# Patient Record
Sex: FEMALE | Race: WHITE | NOT HISPANIC OR LATINO | Employment: FULL TIME | URBAN - METROPOLITAN AREA
[De-identification: names, ages, dates, MRNs, and addresses within clinical notes are randomized per-mention and may not be internally consistent; named-entity substitution may affect disease eponyms.]

---

## 2017-02-17 ENCOUNTER — OFFICE VISIT - HEALTHEAST (OUTPATIENT)
Dept: INTERNAL MEDICINE | Facility: CLINIC | Age: 37
End: 2017-02-17

## 2017-02-17 DIAGNOSIS — H66.90 AOM (ACUTE OTITIS MEDIA): ICD-10-CM

## 2017-02-17 DIAGNOSIS — J06.9 ACUTE URI: ICD-10-CM

## 2017-02-17 ASSESSMENT — MIFFLIN-ST. JEOR: SCORE: 1557.41

## 2017-06-05 ENCOUNTER — OFFICE VISIT - HEALTHEAST (OUTPATIENT)
Dept: PODIATRY | Facility: CLINIC | Age: 37
End: 2017-06-05

## 2017-06-05 DIAGNOSIS — L60.2 ONYCHAUXIS: ICD-10-CM

## 2017-06-05 DIAGNOSIS — B35.1 NAIL FUNGUS: ICD-10-CM

## 2018-03-02 ENCOUNTER — AMBULATORY - HEALTHEAST (OUTPATIENT)
Dept: FAMILY MEDICINE | Facility: CLINIC | Age: 38
End: 2018-03-02

## 2018-03-02 DIAGNOSIS — Z86.39 HISTORY OF VITAMIN D DEFICIENCY: ICD-10-CM

## 2018-03-02 DIAGNOSIS — I10 HTN (HYPERTENSION): ICD-10-CM

## 2018-03-02 DIAGNOSIS — Z00.00 BLOOD TESTS FOR ROUTINE GENERAL PHYSICAL EXAMINATION: ICD-10-CM

## 2018-03-07 ENCOUNTER — AMBULATORY - HEALTHEAST (OUTPATIENT)
Dept: LAB | Facility: CLINIC | Age: 38
End: 2018-03-07

## 2018-03-07 DIAGNOSIS — Z86.39 HISTORY OF VITAMIN D DEFICIENCY: ICD-10-CM

## 2018-03-07 DIAGNOSIS — I10 HTN (HYPERTENSION): ICD-10-CM

## 2018-03-07 DIAGNOSIS — Z00.00 BLOOD TESTS FOR ROUTINE GENERAL PHYSICAL EXAMINATION: ICD-10-CM

## 2018-03-07 LAB
ANION GAP SERPL CALCULATED.3IONS-SCNC: 12 MMOL/L (ref 5–18)
BUN SERPL-MCNC: 17 MG/DL (ref 8–22)
CALCIUM SERPL-MCNC: 9.9 MG/DL (ref 8.5–10.5)
CHLORIDE BLD-SCNC: 102 MMOL/L (ref 98–107)
CHOLEST SERPL-MCNC: 146 MG/DL
CO2 SERPL-SCNC: 26 MMOL/L (ref 22–31)
CREAT SERPL-MCNC: 0.75 MG/DL (ref 0.6–1.1)
ERYTHROCYTE [DISTWIDTH] IN BLOOD BY AUTOMATED COUNT: 12.5 % (ref 11–14.5)
FASTING STATUS PATIENT QL REPORTED: YES
GFR SERPL CREATININE-BSD FRML MDRD: >60 ML/MIN/1.73M2
GLUCOSE BLD-MCNC: 88 MG/DL (ref 70–125)
HCT VFR BLD AUTO: 46.1 % (ref 35–47)
HDLC SERPL-MCNC: 42 MG/DL
HGB BLD-MCNC: 15.5 G/DL (ref 12–16)
LDLC SERPL CALC-MCNC: 75 MG/DL
MCH RBC QN AUTO: 30.9 PG (ref 27–34)
MCHC RBC AUTO-ENTMCNC: 33.7 G/DL (ref 32–36)
MCV RBC AUTO: 92 FL (ref 80–100)
PLATELET # BLD AUTO: 257 THOU/UL (ref 140–440)
PMV BLD AUTO: 7.7 FL (ref 7–10)
POTASSIUM BLD-SCNC: 4.3 MMOL/L (ref 3.5–5)
RBC # BLD AUTO: 5.03 MILL/UL (ref 3.8–5.4)
SODIUM SERPL-SCNC: 140 MMOL/L (ref 136–145)
TRIGL SERPL-MCNC: 147 MG/DL
WBC: 5.7 THOU/UL (ref 4–11)

## 2018-03-08 ENCOUNTER — COMMUNICATION - HEALTHEAST (OUTPATIENT)
Dept: FAMILY MEDICINE | Facility: CLINIC | Age: 38
End: 2018-03-08

## 2018-03-08 LAB — 25(OH)D3 SERPL-MCNC: 17.9 NG/ML (ref 30–80)

## 2018-03-21 ENCOUNTER — OFFICE VISIT - HEALTHEAST (OUTPATIENT)
Dept: FAMILY MEDICINE | Facility: CLINIC | Age: 38
End: 2018-03-21

## 2018-03-21 DIAGNOSIS — E72.10 DISORDER OF SULFUR-BEARING AMINO ACID METABOLISM (H): ICD-10-CM

## 2018-03-21 DIAGNOSIS — I10 ESSENTIAL HYPERTENSION: ICD-10-CM

## 2018-03-21 DIAGNOSIS — Z00.00 ROUTINE GENERAL MEDICAL EXAMINATION AT A HEALTH CARE FACILITY: ICD-10-CM

## 2018-03-21 DIAGNOSIS — E55.9 VITAMIN D DEFICIENCY: ICD-10-CM

## 2018-03-21 DIAGNOSIS — Z30.432 ENCOUNTER FOR IUD REMOVAL: ICD-10-CM

## 2018-03-21 ASSESSMENT — MIFFLIN-ST. JEOR: SCORE: 1631.72

## 2018-03-29 ENCOUNTER — COMMUNICATION - HEALTHEAST (OUTPATIENT)
Dept: FAMILY MEDICINE | Facility: CLINIC | Age: 38
End: 2018-03-29

## 2018-09-25 ENCOUNTER — OFFICE VISIT - HEALTHEAST (OUTPATIENT)
Dept: FAMILY MEDICINE | Facility: CLINIC | Age: 38
End: 2018-09-25

## 2018-09-25 DIAGNOSIS — J02.0 ACUTE STREPTOCOCCAL PHARYNGITIS: ICD-10-CM

## 2018-09-25 DIAGNOSIS — J02.9 SORE THROAT: ICD-10-CM

## 2018-09-25 DIAGNOSIS — I10 ESSENTIAL HYPERTENSION: ICD-10-CM

## 2018-09-25 LAB — DEPRECATED S PYO AG THROAT QL EIA: NORMAL

## 2018-09-25 ASSESSMENT — MIFFLIN-ST. JEOR: SCORE: 1665.18

## 2018-09-26 LAB — GROUP A STREP BY PCR: NORMAL

## 2018-10-31 ENCOUNTER — OFFICE VISIT - HEALTHEAST (OUTPATIENT)
Dept: FAMILY MEDICINE | Facility: CLINIC | Age: 38
End: 2018-10-31

## 2018-10-31 DIAGNOSIS — R03.0 ELEVATED BLOOD PRESSURE READING: ICD-10-CM

## 2018-10-31 DIAGNOSIS — H92.02 LEFT EAR PAIN: ICD-10-CM

## 2018-10-31 DIAGNOSIS — M26.622 ARTHRALGIA OF LEFT TEMPOROMANDIBULAR JOINT: ICD-10-CM

## 2018-11-27 ENCOUNTER — COMMUNICATION - HEALTHEAST (OUTPATIENT)
Dept: FAMILY MEDICINE | Facility: CLINIC | Age: 38
End: 2018-11-27

## 2018-12-11 ENCOUNTER — COMMUNICATION - HEALTHEAST (OUTPATIENT)
Dept: FAMILY MEDICINE | Facility: CLINIC | Age: 38
End: 2018-12-11

## 2019-01-18 ENCOUNTER — COMMUNICATION - HEALTHEAST (OUTPATIENT)
Dept: FAMILY MEDICINE | Facility: CLINIC | Age: 39
End: 2019-01-18

## 2019-02-19 ENCOUNTER — COMMUNICATION - HEALTHEAST (OUTPATIENT)
Dept: FAMILY MEDICINE | Facility: CLINIC | Age: 39
End: 2019-02-19

## 2019-03-13 ENCOUNTER — OFFICE VISIT - HEALTHEAST (OUTPATIENT)
Dept: FAMILY MEDICINE | Facility: CLINIC | Age: 39
End: 2019-03-13

## 2019-03-13 DIAGNOSIS — I10 ESSENTIAL HYPERTENSION: ICD-10-CM

## 2019-03-13 DIAGNOSIS — J40 BRONCHITIS: ICD-10-CM

## 2019-03-13 LAB
ANION GAP SERPL CALCULATED.3IONS-SCNC: 11 MMOL/L (ref 5–18)
BUN SERPL-MCNC: 14 MG/DL (ref 8–22)
CALCIUM SERPL-MCNC: 10 MG/DL (ref 8.5–10.5)
CHLORIDE BLD-SCNC: 106 MMOL/L (ref 98–107)
CO2 SERPL-SCNC: 24 MMOL/L (ref 22–31)
CREAT SERPL-MCNC: 0.81 MG/DL (ref 0.6–1.1)
GFR SERPL CREATININE-BSD FRML MDRD: >60 ML/MIN/1.73M2
GLUCOSE BLD-MCNC: 114 MG/DL (ref 70–125)
POTASSIUM BLD-SCNC: 4.1 MMOL/L (ref 3.5–5)
SODIUM SERPL-SCNC: 141 MMOL/L (ref 136–145)

## 2019-03-13 NOTE — ASSESSMENT & PLAN NOTE
Patient is in clinic for acute care.  Her blood pressure is well controlled.  She said no side effects from the medications.  She is 38 years old and on an ACE inhibitor.  Her  is had a vasectomy and she is aware that this is a pregnancy category X medication.  -Continue current medication without change.  -Check basic metabolic panel today.  -Recheck blood pressure in 6 months.

## 2019-03-14 LAB
25(OH)D3 SERPL-MCNC: 16.8 NG/ML (ref 30–80)
25(OH)D3 SERPL-MCNC: 16.8 NG/ML (ref 30–80)

## 2019-10-31 ENCOUNTER — OFFICE VISIT - HEALTHEAST (OUTPATIENT)
Dept: FAMILY MEDICINE | Facility: CLINIC | Age: 39
End: 2019-10-31

## 2019-10-31 ENCOUNTER — HOSPITAL ENCOUNTER (OUTPATIENT)
Dept: ULTRASOUND IMAGING | Facility: CLINIC | Age: 39
Discharge: HOME | End: 2019-10-31
Attending: FAMILY MEDICINE

## 2019-10-31 ENCOUNTER — COMMUNICATION - HEALTHEAST (OUTPATIENT)
Dept: FAMILY MEDICINE | Facility: CLINIC | Age: 39
End: 2019-10-31

## 2019-10-31 ENCOUNTER — AMBULATORY - HEALTHEAST (OUTPATIENT)
Dept: FAMILY MEDICINE | Facility: CLINIC | Age: 39
End: 2019-10-31

## 2019-10-31 DIAGNOSIS — I10 ESSENTIAL HYPERTENSION: ICD-10-CM

## 2019-10-31 DIAGNOSIS — N92.0 MENORRHAGIA WITH REGULAR CYCLE: ICD-10-CM

## 2019-10-31 DIAGNOSIS — N63.10 BREAST MASS, RIGHT: ICD-10-CM

## 2019-10-31 DIAGNOSIS — E72.10 DISORDER OF SULFUR-BEARING AMINO ACID METABOLISM (H): ICD-10-CM

## 2019-10-31 ASSESSMENT — MIFFLIN-ST. JEOR: SCORE: 1572.76

## 2019-11-01 ENCOUNTER — HOSPITAL ENCOUNTER (OUTPATIENT)
Dept: MAMMOGRAPHY | Facility: CLINIC | Age: 39
Discharge: HOME | End: 2019-11-01
Attending: FAMILY MEDICINE

## 2019-11-01 ENCOUNTER — HOSPITAL ENCOUNTER (OUTPATIENT)
Dept: ULTRASOUND IMAGING | Facility: CLINIC | Age: 39
Discharge: HOME | End: 2019-11-01
Attending: FAMILY MEDICINE

## 2019-11-01 DIAGNOSIS — N63.10 BREAST MASS, RIGHT: ICD-10-CM

## 2020-03-01 ENCOUNTER — COMMUNICATION - HEALTHEAST (OUTPATIENT)
Dept: FAMILY MEDICINE | Facility: CLINIC | Age: 40
End: 2020-03-01

## 2020-03-01 DIAGNOSIS — I10 ESSENTIAL HYPERTENSION: ICD-10-CM

## 2020-07-20 ENCOUNTER — COMMUNICATION - HEALTHEAST (OUTPATIENT)
Dept: SCHEDULING | Facility: CLINIC | Age: 40
End: 2020-07-20

## 2020-07-20 DIAGNOSIS — Z11.59 SCREENING FOR VIRAL DISEASE: ICD-10-CM

## 2020-07-21 ENCOUNTER — AMBULATORY - HEALTHEAST (OUTPATIENT)
Dept: LAB | Facility: CLINIC | Age: 40
End: 2020-07-21

## 2020-07-21 DIAGNOSIS — Z11.59 SCREENING FOR VIRAL DISEASE: ICD-10-CM

## 2020-07-24 ENCOUNTER — COMMUNICATION - HEALTHEAST (OUTPATIENT)
Dept: LAB | Facility: CLINIC | Age: 40
End: 2020-07-24

## 2020-07-24 ENCOUNTER — OFFICE VISIT - HEALTHEAST (OUTPATIENT)
Dept: FAMILY MEDICINE | Facility: CLINIC | Age: 40
End: 2020-07-24

## 2020-07-24 DIAGNOSIS — R10.13 EPIGASTRIC PAIN: ICD-10-CM

## 2020-07-24 ASSESSMENT — MIFFLIN-ST. JEOR: SCORE: 1653.39

## 2020-11-23 ENCOUNTER — COMMUNICATION - HEALTHEAST (OUTPATIENT)
Dept: FAMILY MEDICINE | Facility: CLINIC | Age: 40
End: 2020-11-23

## 2020-11-23 DIAGNOSIS — I10 ESSENTIAL HYPERTENSION: ICD-10-CM

## 2020-11-23 RX ORDER — LISINOPRIL AND HYDROCHLOROTHIAZIDE 20; 25 MG/1; MG/1
TABLET ORAL
Qty: 45 TABLET | Refills: 3 | Status: SHIPPED | OUTPATIENT
Start: 2020-11-23 | End: 2021-10-25

## 2021-05-27 ENCOUNTER — RECORDS - HEALTHEAST (OUTPATIENT)
Dept: ADMINISTRATIVE | Facility: CLINIC | Age: 41
End: 2021-05-27

## 2021-05-30 VITALS — BODY MASS INDEX: 27.89 KG/M2 | WEIGHT: 184 LBS | HEIGHT: 68 IN

## 2021-05-31 ENCOUNTER — RECORDS - HEALTHEAST (OUTPATIENT)
Dept: ADMINISTRATIVE | Facility: CLINIC | Age: 41
End: 2021-05-31

## 2021-06-01 ENCOUNTER — RECORDS - HEALTHEAST (OUTPATIENT)
Dept: ADMINISTRATIVE | Facility: CLINIC | Age: 41
End: 2021-06-01

## 2021-06-01 VITALS — BODY MASS INDEX: 30.31 KG/M2 | WEIGHT: 200 LBS | HEIGHT: 68 IN

## 2021-06-02 ENCOUNTER — RECORDS - HEALTHEAST (OUTPATIENT)
Dept: ADMINISTRATIVE | Facility: CLINIC | Age: 41
End: 2021-06-02

## 2021-06-02 VITALS — BODY MASS INDEX: 32.01 KG/M2 | WEIGHT: 209 LBS

## 2021-06-02 VITALS — WEIGHT: 202 LBS | BODY MASS INDEX: 30.94 KG/M2

## 2021-06-02 VITALS — BODY MASS INDEX: 31.43 KG/M2 | WEIGHT: 207.38 LBS | HEIGHT: 68 IN

## 2021-06-02 NOTE — PATIENT INSTRUCTIONS - HE
To schedule your pelvic ultrasound at [facility] or [facility]:  Call [phone]

## 2021-06-02 NOTE — PROGRESS NOTES
"SUBJECTIVE  Claire Willoughby is a 38 y.o. female here for:    Chief Complaint   Patient presents with     Consult     Ablation/Hysterectomy     Breast Mass     R breast     HTN: on HCTZ-lisinopril. She reports good compliance. Checks at work 120's/78-80's. Denies chest pain, shortness of breath.      R breast lump- noticed about 2 days ago. 3 finger widths wide. Never noticed this before. Mobile. Nonpainful at first but now that she has been touching it for the past few days, it is tender. Feels \"hot\". No drainage or erythema. No nipple changes or drainage.     Menorrhagia: regular periods. - has vasectomy. She previously had copper IUD but it was removed. She continues to have heavy periods. Has had heavier periods her entire life. Worse since copper IUD removed. Hx of MTHF reductase deficiency and she has contraindication to using hormonal contraception. She feels that she is anemic due to heavy menses. Regular, every 30 days. Periods last 7-10 days- first 4 days are heavy. She uses menstrual cup and it fills every 2 hours. She has clots- half dollar sized. Fills pad in about 2 hours. Had low back pain during her period the last month. Mom- history of hysterectomy in her 40's. She does not desire future fertility-  has vasectomy and she is wondering about ablation vs hysterectomy. No family history of uterine or ovarian or cervical cancer. She is up to date on pap smear. Hx of LEEP 2009? But subsequent normal paps since that time. Denies vaginal discharge, odor.     ROS  Complete 10 point review of systems negative except as noted above in HPI    Reviewed Past Medical History, Medications, Family History and Social History in Epic and up to date with no new changes.    OBJECTIVE  /88   Pulse 72   Temp 97.8  F (36.6  C) (Oral)   Resp 16   Ht 5' 7.75\" (1.721 m)   Wt 187 lb (84.8 kg)   LMP 10/21/2019 (Exact Date)   BMI 28.64 kg/m       General: Cooperative, pleasant, in no acute " distress  Breast: R breast with mobile, circular 3x3 cm subcutaneous mass at approximately 10 o clock. Normal nipple without inversion or discharge. Normal left breast.  CV: RRR, normal S1/S2, no murmur, rubs, gallops  Resp: No respiratory distress. Clear to auscultation bilaterally. No wheezes, rales, rhonchi  : Deferred per patient request- pelvic exam normal 3/2018.      ASSESSMENT/PLAN:   Claire was seen today for consult and breast mass.    Diagnoses and all orders for this visit:    Menorrhagia with regular cycle: Longstanding heavy periods, regular. She had copper IUD that was removed March 2018 and she continues to have menorrhagia with clots. Will obtain baseline hemoglobin. - history of vasectomy and she does not desire future fertility. Up to date on pap smear. Normal pelvic exam 3/2018. Deferred repeat exam today. Referred for pelvic ultrasound and then would consider referral to OB/GYN for discussion of treatment for menorrhagia. She is not candidate for hormonal methods/mirena IUD due to history of MTHF reductase deficiency.   -     HM2(CBC w/o Differential)  -     US Pelvis With Transvaginal Non OB; Future    Breast mass, right: Noticed for last few days, mobile, slightly tender circular 3x3 cm mass at 10 o clock in R breast. No overlying erythema or nipple changes/drainage. Referred for imaging and further diagnostic evaluation.  -     US Breast Right Limited 1-3 Quadrants; Future  -     Mammo Diagnostic Bilateral; Future    Essential hypertension: BP elevated initially but recheck was normal. Continue lisinopril-HCTZ- could increase dose in future if necessary. Non-smoker. Asymptomatic. She had normal BMP 3/2019.     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  has vasectomy- she is monogamous with . Copper IUD was removed 2018.      RTC in March 2020 for annual physical      Options for treatment  and follow-up care were reviewed with the patient. Claire KIERRA Willuoghby and/or guardian was engaged and actively involved in the decision making process. Claire Willoughby and/or guardian verbalized understanding of the options discussed and was satisfied with the final plan.      Sharifa Naylor MD

## 2021-06-03 VITALS
TEMPERATURE: 97.8 F | SYSTOLIC BLOOD PRESSURE: 128 MMHG | DIASTOLIC BLOOD PRESSURE: 88 MMHG | RESPIRATION RATE: 16 BRPM | BODY MASS INDEX: 28.34 KG/M2 | WEIGHT: 187 LBS | HEART RATE: 72 BPM | HEIGHT: 68 IN

## 2021-06-04 VITALS
RESPIRATION RATE: 18 BRPM | OXYGEN SATURATION: 97 % | HEART RATE: 85 BPM | BODY MASS INDEX: 32.55 KG/M2 | WEIGHT: 207.4 LBS | HEIGHT: 67 IN | SYSTOLIC BLOOD PRESSURE: 124 MMHG | DIASTOLIC BLOOD PRESSURE: 86 MMHG | TEMPERATURE: 97.9 F

## 2021-06-09 NOTE — PROGRESS NOTES
"  Chief Complaint   Patient presents with     Abdominal Pain     started on Monday, feels discomfort with every food she eats, feels as if her stomach is full uncomfortably. Much better when stading or laying down. Has had diarrhea, and has diarrhea during menstruation.          HPI:   Claire De La Cruz is a 39 y.o. female c/o epigastric pain for the last four days after eating.  Improves after about two hours.  Feels worse with sitting.  No fever.  No nausea. No vomiting.  Has had some persistent loose stools over the last 6 months.  No blood in stools.  Thinks she eats dairy with most meals.  Seems to be worse after evening meal.  Doesn't keep her from sleeping.  Better in the morning.  No weight loss.  No one else at home is sick.    No mucous in stools.    Periods have changed a little in the last 6 months- a little heavier, a little longer bleeding.  No cramping.   has had a vasectomy.    No contributory family history.    ROS:  A 10 point comprehensive review of systems was negative except as noted.     Medications:  Current Outpatient Medications on File Prior to Visit   Medication Sig Dispense Refill     lisinopril-hydrochlorothiazide (PRINZIDE,ZESTORETIC) 10-12.5 mg per tablet Take 1 tablet by mouth daily. 90 tablet 3     No current facility-administered medications on file prior to visit.          Social History:  Social History     Tobacco Use     Smoking status: Never Smoker     Smokeless tobacco: Never Used   Substance Use Topics     Alcohol use: Yes     Comment: Seldom         Physical Exam:   Vitals:    07/24/20 1319   BP: 124/86   Pulse: 85   Resp: 18   Temp: 97.9  F (36.6  C)   TempSrc: Oral   SpO2: 97%   Weight: 207 lb 6.4 oz (94.1 kg)   Height: 5' 7\" (1.702 m)       GENERAL:   Alert. Oriented.  EYES: Clear  HENT:  Ears: R TM pearly gray. Normal landmarks. L TM pearly gray.  Normal landmarks  Nose: Clear.  Sinuses: Nontender.  Oropharynx:  No erythema. No exudate.  NECK: Supple. No " adenopathy.  LUNGS: Clear to ascultation.  No crackles.  No wheezing  HEART: RRR  SKIN:  No rash.   ABDOMEN:  +BS. No tenderness. Soft, no guarding, rebound, rigidity,mass, or organomegaly. No CVA tenderness          Assessment/Plan:    1. Epigastric pain        She has omeprazole at home and will start that daily.  May also try an antacid.  Discussed warning signs--if occur needs prompt evaluation.    If no improvement in 3-4 weeks, recheck.          Yen De La Cruz MD      7/24/2020    The following portions of the patient's history were reviewed and updated as appropriate: allergies, current medications, past family history, past medical history, past social history, past surgical history and problem list.

## 2021-06-09 NOTE — TELEPHONE ENCOUNTER
"Patient is calling requesting COVID serologic antibody testing.  NOTE: Serologic testing is a blood test for 'antibodies' which are made at 10-14 days after you have had symptoms of COVID or were exposed and had an asymptomatic infection.  This does NOT test you for 'active' infection or tell you if you are contagious.    Are you a healthcare worker?  Yes  Do you currently have a cough, fever, body aches, shortness of breath, or difficulty breathing?  No  Did you previously have cough, fever, body aches, shortness of breath, or difficulty breathing that have now resolved? Has had previous covid symptoms.     Symptoms started > 14 days ago. Lab order placed per SARS-CoV-2 Serology test Standing Order using indication \"Previously symptomatic >14d since onset, currently asymptomatic\" and diagnosis code \"Screening for viral disease\" (Z11.59)      The patient was informed: \"Testing is limited each day and it may take time for testing to be available to everyone who has called. You will receive a call within 48-72 hours to schedule the serology testing. Please confirm the best number to reach you is [phone]. If you have any questions about scheduling, call [phone].\"     "

## 2021-06-11 NOTE — PROGRESS NOTES
Admission History & Physical  Claire Willoughby, 1980, [id number]    [facility]  No Primary Care Provider, None    Extended Emergency Contact Information  Primary Emergency Contact: Royal Rajan   [facility]  Home Phone: [phone]  Relation: Spouse     Assessment and Plan:   Assessment: Onychomycosis, onychauxis second toe right foot  Plan: Debrided the second toenail right foot  Active Problems:    * No active hospital problems. *      Chief Complaint:  thick discolored toenails second toe right foot      HPI:    Claire Willoughby is a 36 y.o. old female who presented to the clinic today complaining of a very thick painful discolored second toenail right foot.  The patient indicated she has had this problem toenail for several months.  She stated that she recalls injuring the toenail several years ago.  The nail is aggravated by shoe gear.  She has not had any redness, swelling, drainage or bleeding.  She denies any other previous treatment.  History is provided by patient    Medical History  Active Ambulatory (Non-Hospital) Problems    Diagnosis     Methylenetetrahydrofolate Reductase Deficiency     Hyperlipidemia     Vitamin D Deficiency     Proteinuria     Hypertension     Past Medical History:   Diagnosis Date     Abnormal Pap smear of cervix      Hypertension      Postpartum hemorrhage 2010     Patient Active Problem List    Diagnosis Date Noted     Methylenetetrahydrofolate Reductase Deficiency      Hyperlipidemia      Vitamin D Deficiency      Proteinuria      Hypertension      Surgical History  She  has a past surgical history that includes Cholecystectomy and Cervical biopsy w/ loop electrode excision (2009).   Past Surgical History:   Procedure Laterality Date     CERVICAL BIOPSY  W/ LOOP ELECTRODE EXCISION  2009    unknown dx.  normal paps since.     CHOLECYSTECTOMY      Social History  Reviewed, and she  reports that she has never smoked. She does not have any smokeless  tobacco history on file.  Social History   Substance Use Topics     Smoking status: Never Smoker     Smokeless tobacco: Not on file     Alcohol use Not on file      Allergies  Allergies   Allergen Reactions     Benzonatate Nausea And Vomiting     No Known Drug Allergies     Family History  Reviewed, and family history includes Hypertension in her father; No Medical Problems in her mother.   Psychosocial Needs  Social History     Social History Narrative     Additional psychosocial needs reviewed per nursing assessment.       Prior to Admission Medications     (Not in a hospital admission)        Review of Systems - Negative     BP (!) 142/112  Pulse 78  SpO2 99%    Objective findings: General: The patient is alert and in no acute distress      Integument: Nails bilateral feet are  normal length and color with the exception of the second toenail right foot.  This particular nail is 3-4 times normal thickness with subungual debris present. Skin bilateral feet warm and intact.      Vascular: DP pulses are +2/4 bilateral feet.   Capillary refill less than 2 seconds bilateral feet.      Neurologic: Negative clonus, negative Babinski bilateral feet.       Musculoskeletal: Range of motion within normal limits bilaterally.  Muscle power +5/5 bilaterally in all compartments.      Assessment: Onychomycosis, onychauxis second toe right foot, diabetes mellitus      Plan: Debrided the second toenail right foot.  I recommended the patient return to the clinic as needed for continued foot care.

## 2021-06-13 NOTE — TELEPHONE ENCOUNTER
RN cannot approve Refill Request    RN can NOT refill this medication Protocol failed and NO refill given. Last office visit: 10/31/2019 Sharifa Naylor MD Last Physical: 3/21/2018 Last MTM visit: Visit date not found Last visit same specialty: 7/24/2020 Yen De La Cruz MD.  Next visit within 3 mo: Visit date not found  Next physical within 3 mo: Visit date not found      Carlie Valencia, [facility] Triage/Med Refill 11/23/2020    Requested Prescriptions   Pending Prescriptions Disp Refills     lisinopriL-hydrochlorothiazide (PRINZIDE,ZESTORETIC) 20-25 mg per tablet [Pharmacy Med Name: LISINOPRIL-HCTZ 20/25MG TABLETS] 45 tablet 0     Sig: TAKE 1/2 TABLET BY MOUTH DAILY       Diuretics/Combination Diuretics Refill Protocol  Failed - 11/23/2020  3:56 AM        Failed - Serum Potassium in past 12 months      No results found for: LN-POTASSIUM          Failed - Serum Sodium in past 12 months      No results found for: LN-SODIUM          Failed - Serum Creatinine in past 12 months      Creatinine   Date Value Ref Range Status   03/13/2019 0.81 0.60 - 1.10 mg/dL Final             Passed - Visit with PCP or prescribing provider visit in past 12 months     Last office visit with prescriber/PCP: 10/31/2019 Sharifa Naylor MD OR same dept: 7/24/2020 Yen De La Cruz MD OR same specialty: 7/24/2020 Yen De La Cruz MD  Last physical: 3/21/2018 Last MTM visit: Visit date not found   Next visit within 3 mo: Visit date not found  Next physical within 3 mo: Visit date not found  Prescriber OR PCP: Sharifa Naylor MD  Last diagnosis associated with med order: 1. Essential hypertension  - lisinopriL-hydrochlorothiazide (PRINZIDE,ZESTORETIC) 20-25 mg per tablet [Pharmacy Med Name: LISINOPRIL-HCTZ 20/25MG TABLETS]; TAKE 1/2 TABLET BY MOUTH DAILY  Dispense: 45 tablet; Refill: 0    If protocol passes may refill for 12 months if within 3 months of last provider visit (or a total of 15 months).             Passed - Blood pressure  on file in past 12 months     BP Readings from Last 1 Encounters:   07/24/20 124/86

## 2021-06-16 PROBLEM — N92.0 MENORRHAGIA WITH REGULAR CYCLE: Status: ACTIVE | Noted: 2019-11-02

## 2021-06-16 NOTE — PROGRESS NOTES
Assessment:     Claire was seen today for establish care and annual exam.    Diagnoses and all orders for this visit:    Routine general medical examination at a health care facility    Hypertension: Initially elevated BP but normotensive on repeat. Encouraged to continue checking at-home and call if SBP >140 or DBP >90 consistently and would increase dose of her antihypertensive. Had recent BMP that was normal. Asymptomatic, nonsmoker.  -     Refilled lisinopril-hydrochlorothiazide (PRINZIDE,ZESTORETIC) 10-12.5 mg per tablet; Take 1 tablet by mouth daily.    Vitamin D deficiency: Recently checked, 17.9. Continue daily supplement for maintenance therapy.    Methylenetetrahydrofolate Reductase Deficiency: Reportedly was on anticoagulation during pregnancy and told she should never have hormonal birth control. Will obtain CABRERA today for her OB/GYN clinic.     Encounter for IUD removal: Paraguard placed 2010, her fiance had vasectomy. She denies wanting other children- has one child and he has two other children. See procedure note below- tolerated procedure well without complications. If heavy periods continue despite removal of copper IUD, then could consider vaginal ultrasound for evaluation. She had normal appearing cervix on exam today.       Subjective:      Claire Willoughby is a 37 y.o. female who presents for an annual exam. The patient is sexually active. The patient participates in regular exercise: no. The patient reports that there is not domestic violence in her life.     Heavy periods 7-8 days. 5-6 pads per day. Had heavy periods before paraguard  Paraguard- placed 8 years ago. Desires removal today  Fiance- vasectomy  Does not desire more children  Still having some intermitent spotting- does not require pain  Occasionally a sharp pain in RLQ    HTN: checks at home occasionally and always normal. SBP around 110-120 usually. Denies severe headaches, SOB, chest pain. Did not take dose of her medication  yesterday. She reports good compliance otherwise. Recent BMP was normal.     Healthy Habits:   Regular Exercise: No  Sunscreen Use: Yes  Healthy Diet: Yes  Dental Visits Regularly: Yes  Seat Belt: Yes  Sexually active: Yes  Self Breast Exam Monthly:Yes  Hemoccults: N/A  Flex Sig: N/A  Colonoscopy: N/A  Lipid Profile: Yes  Glucose Screen: Yes  Prevention of Osteoporosis: No  Last Dexa: N/A  Guns at Home:  No      Immunization History   Administered Date(s) Administered     DT (pediatric) 1998, 2005     HPV Quadrivalent 2008, 2008, 2009     Hep B, historic 1998, 1998, 2005     Influenza [id number], 2009     Influenza, inj, historic,unspecified 10/30/2008, 10/21/2009, 2010     Influenza, seasonal,quad inj 6-35 mos 10/04/2012     MMR 1995     Td,adult,historic,unspecified 2005     Tdap 2015     Immunization status: up to date and documented.      Gynecologic History  Patient's last menstrual period was 2018 (approximate).  Contraception: copper IUD, placed   Last Pap: 7/24/15, normal with cotesting. Not due for another 5 years  Last mammogram: N/A      OB History    Para Term  AB Living   1 1    1   SAB TAB Ectopic Multiple Live Births             # Outcome Date GA Lbr Ashish/2nd Weight Sex Delivery Anes PTL Lv   1 Para                   Current Outpatient Prescriptions   Medication Sig Dispense Refill     copper (PARAGARD T 380A) 380 square mm IUD by Intrauterine route. Inserted        lisinopril-hydrochlorothiazide (PRINZIDE,ZESTORETIC) 10-12.5 mg per tablet Take 1 tablet by mouth daily. 90 tablet 3     albuterol (PROVENTIL HFA;VENTOLIN HFA) 90 mcg/actuation inhaler Inhale 2 puffs every 4 (four) hours as needed for wheezing. 8.5 g 2     No current facility-administered medications for this visit.      Past Medical History:   Diagnosis Date     Abnormal Pap smear of cervix      Hypertension      Postpartum hemorrhage  "2010     Past Surgical History:   Procedure Laterality Date     CERVICAL BIOPSY  W/ LOOP ELECTRODE EXCISION  2009    unknown dx.  normal paps since.     CHOLECYSTECTOMY       Benzonatate and No known drug allergies  Family History   Problem Relation Age of Onset     No Medical Problems Mother      Hypertension Father      Social History     Social History     Marital status: Single     Spouse name: N/A     Number of children: N/A     Years of education: N/A     Occupational History     Not on file.     Social History Main Topics     Smoking status: Never Smoker     Smokeless tobacco: Never Used     Alcohol use Yes      Comment: Seldom     Drug use: No     Sexual activity: Yes     Partners: Male     Birth control/ protection: IUD     Other Topics Concern     Not on file     Social History Narrative       Review of Systems  General:  Denies problem  Eyes: Denies problem  Ears/Nose/Throat: Denies problem  Cardiovascular: Denies problem  Respiratory:  Denies problem  Gastrointestinal:  Denies problem, Genitourinary: Denies problem  Musculoskeletal:  Denies problem  Skin: Denies problem  Neurologic: Denies problem  Psychiatric: Denies problem  Endocrine: Denies problem  Heme/Lymphatic: Denies problem   Allergic/Immunologic: Denies problem        Objective:         Vitals:    03/21/18 1228 03/21/18 1322   BP: (!) 156/106 136/88   Pulse: 72    Resp: 20    Temp: 98.1  F (36.7  C)    TempSrc: Oral    Weight: 200 lb (90.7 kg)    Height: 5' 7.75\" (1.721 m)      Body mass index is 30.63 kg/(m^2).    Physical Exam:  General Appearance: Alert, cooperative, no distress, appears stated age  Head: Normocephalic, without obvious abnormality, atraumatic  Eyes: PERRL, conjunctiva/corneas clear, EOM's intact  Ears: Normal TM's and external ear canals, both ears  Nose: Nares normal, septum midline,mucosa normal, no drainage  Throat: Lips, mucosa, and tongue normal; teeth and gums normal  Neck: Supple, symmetrical, trachea midline, no " adenopathy;  thyroid: not enlarged, symmetric, no tenderness/mass/nodules  Back: Symmetric, no curvature, ROM normal, no CVA tenderness  Lungs: Clear to auscultation bilaterally, respirations unlabored  Breasts: No breast masses, tenderness, asymmetry, or nipple discharge.  Heart: Regular rate and rhythm, S1 and S2 normal, no murmur, rub, or gallop, Abdomen: Soft, non-tender, bowel sounds active all four quadrants,  no masses, no organomegaly  Pelvic:Normally developed genitalia with no external lesions or eruptions. Vagina and cervix show no lesions, inflammation, discharge or tenderness. No cystocele, No rectocele. Uterus normal.  No adnexal mass or tenderness.  Extremities: Extremities normal, atraumatic, no cyanosis or edema  Skin: Skin color, texture, turgor normal, no rashes or lesions  Lymph nodes: Cervical, supraclavicular, and axillary nodes normal  Neurologic: Normal         PROCEDURE NOTE: Paraguard IUD removal    This 37 y.o. female presents today for removal of her copper IUD, which was inserted 8 years ago. Claire reports her partner had vasectomy and she is having heavy periods.    I discussed with the patient the potential risks and benefits of IUD removal. The alternatives forms of birth control were discussed with the patient.  The patient understands the procedure, has had ample time to ask questions. Written consent was obtained- see chart.    Patient was positioned and the the IUD strings were visualized.  The strings were grasped with forceps and the IUD was gently removed. Patient tolerated procedure well.  The patient was advised to call for any fever or for prolonged or severe pain or bleeding.       Sharifa Naylor MD

## 2021-06-18 NOTE — LETTER
Letter by Sharifa Naylor MD at      Author: Sharifa Naylor MD Service: -- Author Type: --    Filed:  Encounter Date: 1/18/2019 Status: (Other)       Claire Lopezid  [address] [facility] [address]      January 18, 2019      Dear Claire    In reviewing your records, we have determined a gap in your preventive services. Based on your age and health history, we recommend the follow service.     ? Blood pressure check      If you have had the service elsewhere, please contact us so we can update our records. Please let us know if you have transferred your care to another clinic.    Please call [phone] to schedule this appointment.    We believe that a strong preventive care program, including regular physicals and follow-up care is an important part of a healthy lifestyle and we are committed to helping you maintain your health.    Thank you for choosing us as your health care provider.    Sincerely,     [facility]

## 2021-06-20 NOTE — PROGRESS NOTES
ASSESSMENT and plan   1. Sore throat  Rapid strep test done today.  Test results were negative awaiting culture results  - Rapid Strep A Screen-Throat  - Group A Strep, RNA Direct Detection, Throat    2. Acute streptococcal pharyngitis    2 children have confirmed streptococcal pharyngitis at home.  One child tested positive after 24 hours.  She has had symptoms for 5 days.  She works in a group home.  The rapid test was negative based on clinical symptoms I will presumptively treat her today with azithromycin side effects of medication discussed.      3.  Hypertension    Been taking her medication faithfully, I have refilled her prescription for she is going to inquire about getting a mail order prescription    There are no Patient Instructions on file for this visit.    Orders Placed This Encounter   Procedures     Rapid Strep A Screen-Throat     Group A Strep, RNA Direct Detection, Throat     Medications Discontinued During This Encounter   Medication Reason     lisinopril-hydrochlorothiazide (PRINZIDE,ZESTORETIC) 10-12.5 mg per tablet Reorder       No Follow-up on file.    CHIEF COMPLAINT:  Chief Complaint   Patient presents with     Sore Throat     since Friday     Ear pressure     Cough     since friday.  Started coughing up phlegm today.        HISTORY OF PRESENT ILLNESS:  Claire is a 37 y.o. female   Is here because she has had a sore throat for approximately 5 days.  Started on Friday.  She has been noticing throat pain she has been trying Tylenol in the halls for that.  She also mentions that she has had a dry cough that makes her chest hurt.  She has been feeling achy tired and fatigued she went to work yesterday but came back early.  She has 2 children age 13 and 9 with confirmed streptococcal pharyngitis at home.    REVIEW OF SYSTEMS:     General positive for fatigue positive for low-grade fever  HEENT positive for sore throat, positive for occasional headaches,  Pulmonary positive for dry cough and  "chest discomfort  Musculoskeletal positive for pain in her neck and shoulders  GI positive for decreased appetite  All other systems are negative.    PFSH:     with 2 children  Works as  in a group home        TOBACCO USE:  History   Smoking Status     Never Smoker   Smokeless Tobacco     Never Used       VITALS:  Vitals:    09/25/18 1027   BP: 126/84   Pulse: 84   Resp: 20   Temp: 97.9  F (36.6  C)   TempSrc: Oral   SpO2: 97%   Weight: 207 lb 6 oz (94.1 kg)   Height: 5' 7.75\" (1.721 m)     Wt Readings from Last 3 Encounters:   09/25/18 207 lb 6 oz (94.1 kg)   03/21/18 200 lb (90.7 kg)   02/17/17 184 lb (83.5 kg)       PHYSICAL EXAM:    Fatigued appearing  female sitting in exam room in no acute distress    HEENT neck supple mucous membranes moist TMs are not visualized due to wax.  No sinus tenderness.  No lymph enlargement noted in the neck.  Erythema noted on the posterior pharyngeal wall.  Respiratory system clear to auscultation equal breath sounds no wheezes no crackles good inspiratory effort  [facility] regular rate and rhythm no murmurs rubs gallops appreciated  Abdomen soft there is no focal tenderness bowel sounds are absent    DATA REVIEWED:  Additional History from Old Records Summarized (2):   Reviewed PCP note from physical done in March.  Decision to Obtain Records (1): 0  Radiology Tests Summarized or Ordered (1): 0  Labs Reviewed or Ordered (1):   Rapid strep test done was negative   Medicine Test Summarized or Ordered (1): 0  Independent Review of EKG or X-RAY(2 each): 0    The visit lasted a total of 20 minutes face to face with the patient. Over 50% of the time was spent counseling and educating the patient about   Pharyngitis and hypertension.    MEDICATIONS:  Current Outpatient Prescriptions   Medication Sig Dispense Refill     lisinopril-hydrochlorothiazide (PRINZIDE,ZESTORETIC) 10-12.5 mg per tablet Take 1 tablet by mouth daily. 90 tablet 3     albuterol (PROVENTIL " HFA;VENTOLIN HFA) 90 mcg/actuation inhaler Inhale 2 puffs every 4 (four) hours as needed for wheezing. 8.5 g 2     azithromycin (ZITHROMAX Z-RED) 250 MG tablet Take 2 tablets (500 mg) on  Day 1,  followed by 1 tablet (250 mg) once daily on Days 2 through 5. 6 tablet 0     copper (PARAGARD T 380A) 380 square mm IUD by Intrauterine route. Inserted 2010       No current facility-administered medications for this visit.        Data points 3      Please note that this clinical encounter uses voice recognition software, there may be typographical errors present

## 2021-06-20 NOTE — LETTER
Letter by Danna Younger RN at      Author: Danna Younger RN Service: -- Author Type: --    Filed:  Encounter Date: 7/24/2020 Status: (Other)       7/24/2020        Claire De La Cruz  [address] [facility] [address]    COVID-19 Antibody Screen   Date Value Ref Range Status   07/21/2020 Negative  Final     Comment:     No COVID-19 antibodies detected.  Patients within 10 days of symptom onset for  COVID-19 may not produce sufficient levels of detectable antibodies.  Immunocompromised COVID-19 patients may take longer to develop antibodies.     COVID-19 IgG Titer   Date Value Ref Range Status   07/21/2020 Not Applicable  Final     Comment:     Qualitative screen for total antibodies to COVID-19 (SARS-CoV-2) with  semi-quantitative measurement of IgG COVID-19 antibodies by endpoint titer.  COVID-19 antibodies may be elevated due to a past or current infection.  Negative results do not rule out COVID-19 infection.  Results from antibody  testing should not be used as the sole basis to diagnose or exclude SARS-CoV-2  infection or to inform infection status.  COVID-19 PCR test should be ordered  if current infection is suspected.  False positive results may occur in rare  cases due to cross-reacting antibodies.  This test was developed and its performance characteristics determined by the  [facility] ([facility]),  which is regulated under CLIA as qualified to perform high-complexity testing.  This test has not been reviewed by the FDA.  Testing performed by [facility], [facility], [address], [address], [place] [address]       No results found for: [id number]    You have tested NEGATIVE for COVID-19 antibodies. This suggests you have not had or been exposed to COVID-19. But it does not mean that for sure.    The test finds antibodies in most people 10 days after they get sick. For some people, it takes  longer than 10 days for antibodies to show up. Others may never show antibodies against COVID-19, especially if they have weak immune systems.    If you have COVID-19 symptoms now, please stay home and away from others.     Your current symptoms may or may not be COVID-19.     What is antibody testing?  This is a kind of blood test. We take a small sample of your blood, and then test it for something called antibodies.   Your body makes antibodies to fight infection. If your blood has antibodies for a certain germ, it means youve been infected with that germ in the past.   Sometimes, antibodies stay in your body for years after youve had the infection. They can be there even if the germ didnt make you sick. They are a sign that your body fought off the infection.  Will this test find antibodies in everyone whos had COVID-19?  No. The test finds antibodies in most people 10 days after they get sick. For some people, it takes longer than 10 days for antibodies to show up. Others may never show antibodies against COVID-19, especially if they have weak immune systems.  What are the signs of COVID-19?  Signs of COVID-19 can appear from 2 to 14 days (up to 2 weeks) after youre infected. Some people have no symptoms or only mild symptoms. Others get very sick. The most common symptoms are:      Cough    Shortness of breath or trouble breathing    Or at least 2 of these symptoms:      Fever    Chills    Repeated shaking with chills    Muscle pain    Headache    Sore throat    Losing your sense of taste or smell    You may have other symptoms. Please contact your doctor or clinic for any symptoms that worry you.    Where can I get more information?     To learn the [facility] guidelines for staying home, please visit the [facility] website at [email or web address]    To learn more about COVID-19 and how to care for yourself at home, please visit the CDC website at  https://www.cdc.gov/coronavirus/2019-ncov/about/steps-when-sick.html    For more options for care at [facility], please visit our website at [email or web address]    [place] Department of [facility] ([facility]) COVID-19 Hotline:  [phone]

## 2021-06-21 NOTE — PROGRESS NOTES
Assessment/Plan:   Left ear pain  Pain left ear for 2 weeks. TM and canal are normal. Some TMJ pain on the left. Normal dental exam previously and they thought pain related to to TMJ and referred to facial pain clinic. I agree with that assessment as the TM does ont show infection or other abnormality.   Elevated BP  Known stable hypertension but markedly elevated level today - in setting of regular sudafed use for the last 2 weeks and occasional ibuprofen. 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 No acute cardiac or neuro symptoms. She will discontinue sudafed and return if umbers are not improving.     - fluticasone (FLONASE) 50 mcg/actuation nasal spray; 1 spray each nostril once or twice daily.  Dispense: 18 g; Refill: 2    Ice to the left TMJ as needed  Tylenol or ibuprofen as needed  Consider a mouth guard and keep appointment for face pain clinic  flonase twice a day  Soft foods  Recheck blood pressure at work  Follow up as needed    Subjective:      Claire Willoughby is a 37 y.o. female who presents with left ear pain.  This started about 2 weeks ago.  She had been treated for strep and sinus infection on 9/25/18 with a Zpak and did feel completely well after that. About 2 weeks ago she felt pain in the left ear.  It has become more persistent and more painful, radiating to the jaw. No sinus pain or pressure. She often has a fluid sound in her ears, both sides and that persists though now associated with pain as well. No loss of hearing or muffled sounds, no ringing, no plugging sensation. She has been taking sudafed regularly for 2 weeks to help with the ear pain even though it hasn't helped at all. She has also been taking ibuprofen off and on.  It was worse last night and she had trouble sleeping. She finds that sleeping on her right side the pain left ear is worse, feels better lying on the left ear. No drainage, no injury, no flights, no swimming. No fever. She went to the dentist when the  pain started in the jaw to make sure it wasn't a tooth problem. They said the teeth were fine and that it was probably a TMJ issue and referred her to a facial pain clinic but to have the ear looked at first to make sure there was no ear infection. no sign of grinding per dentist. She thinks she may clench her teeth.  She does not chew gum. She has history of hypertension, controlled on lisinopril-HCTZ. No HA, vertigo, N/V, CP, shortness of breath, wheeze or cough. Non smoker.     Allergies   Allergen Reactions     Benzonatate Nausea And Vomiting     No Known Drug Allergies      Current Outpatient Prescriptions on File Prior to Visit   Medication Sig Dispense Refill     lisinopril-hydrochlorothiazide (PRINZIDE,ZESTORETIC) 10-12.5 mg per tablet Take 1 tablet by mouth daily. 90 tablet 3     albuterol (PROVENTIL HFA;VENTOLIN HFA) 90 mcg/actuation inhaler Inhale 2 puffs every 4 (four) hours as needed for wheezing. 8.5 g 2     copper (PARAGARD T 380A) 380 square mm IUD by Intrauterine route. Inserted 2010       No current facility-administered medications on file prior to visit.      Patient Active Problem List   Diagnosis     Methylenetetrahydrofolate Reductase Deficiency     Hyperlipidemia     Vitamin D Deficiency     Proteinuria     Hypertension       Objective:     BP (!) 182/88 (Patient Site: Right Arm, Patient Position: Sitting, Cuff Size: Adult Large)  Pulse 72  Temp 98.2  F (36.8  C) (Oral)   Resp 18  Wt 209 lb (94.8 kg)  SpO2 100%  BMI 32.01 kg/m2    Physical  General Appearance: Alert, cooperative, no distress  Head: Normocephalic, without obvious abnormality, atraumatic  Eyes: Conjunctivae are normal.  Ears: Normal TM and external ear canal on the right. On the left, there is no pain with retraction of the pinna or pressure on the tragus, normal canal. The left TM has a white fluffy appearing scar anteriorly but normal color, landmarks and light reflex. No pain directly with palpation of either TMJ and  no definite crepitus or clunk but there eis pain in the left TMJ with jaw opening and closing.   Nose: No significant congestion. No sinus pain  Throat: Throat is normal.  No exudate.  No significant lesions  Neck: No adenopathy  Lungs: Clear to auscultation bilaterally, respirations unlabored  Heart: Regular rate and rhythm  Skin: Skin color, texture, turgor normal, no rashes or lesions  Psychiatric: Patient has a normal mood and affect.

## 2021-06-23 NOTE — TELEPHONE ENCOUNTER
Called patient and patient terminated call.  Will send letter to have patient schedule HTN appointment.

## 2021-06-24 NOTE — PROGRESS NOTES
Assessment/Plan:    Claire was seen today for cough.    Diagnoses and all orders for this visit:    Bronchitis: The patient symptoms are most consistent with bronchitis.  We will treat symptomatically with codeine/guaifenesin as well as Tessalon Perles.  If her symptoms have not improved in the next 7-10 days, I would treat her for walking pneumonia with a course of azithromycin.  There is no evidence of bacterial infection today based on her exam which is essentially normal.    Other orders  -     codeine-guaiFENesin (GUAIFENESIN AC)  mg/5 mL liquid; Take 5 mL by mouth 2 (two) times a day as needed for cough.  -     benzonatate (TESSALON) 200 MG capsule; Take 1 capsule (200 mg total) by mouth 3 (three) times a day as needed for cough.    Hypertension  Patient is in clinic for acute care.  Her blood pressure is well controlled.  She said no side effects from the medications.  She is 38 years old and on an ACE inhibitor.  Her  is had a vasectomy and she is aware that this is a pregnancy category X medication.  -Continue current medication without change.  -Check basic metabolic panel today.  -Recheck blood pressure in 6 months.       Return in about 14 days (around 3/27/2019) for recheck if not improving.    Haris Ellsworth MD  _______________________________    Chief Complaint   Patient presents with     Cough     x 4 weeks, pt concerned she has pneumonia      Subjective: Claire Willoughby is a 38 y.o. year old female who I have not seen in clinic before who presents with the following acute complaint(s):    cough:   - she has been sick for ~4 weeks.  Family members were hospitalized with bronchitis.   - deep cough.  Some productivity.  Throat feels unwell.  Feels dry   - energy: okay   - no shortness of breath, no wheezing   - sleep affected   - worsening.   - no fevers.  No chills   - fits of coughing, in particular at night   - no recent previous episodes.  She has had an episode.    HTN:    - doing  well.  Denies lightheadedness, dizziness, chest pain, chest tightness, chronic cough.   -  with vasc   - takes her medication.   - no swelling    ROS: Complete review of systems obtained.  Pertinent items are listed above.     The following portions of the patient's history were reviewed and updated as appropriate: allergies, current medications, past medical history and problem list.     Objective:   /70 (Patient Site: Left Arm, Patient Position: Sitting, Cuff Size: Adult Large)   Pulse 92   Temp 98.5  F (36.9  C) (Oral)   Resp 18   Wt 202 lb (91.6 kg)   LMP 02/20/2019   SpO2 98% Comment: room air  Breastfeeding? No   BMI 30.94 kg/m    Gen.: No acute distress  HEENT: Tympanic membranes bilaterally are gray and glistening.  No postauricular, submandibular, anterior cervical lymphadenopathy.  Posterior pharynx without erythema or tonsillar exudate.  Cardiac: Regular rate and rhythm, normal S1/S2, no murmurs or gallops  Respiratory: Clear to auscultation bilaterally.    No results found for this or any previous visit (from the past 24 hour(s)).  No results found.    Additional History from Old Records Summarized (2): no  Decision to Obtain Records (1): no  Radiology Tests Summarized or Ordered (1): no  Labs Reviewed or Ordered (1): yes  Medicine Test Summarized or Ordered (1): no  Independent Review of EKG or X-RAY(2 each): no    This note has been dictated using voice recognition software. Any grammatical or context distortions are unintentional and inherent to the software

## 2021-06-24 NOTE — TELEPHONE ENCOUNTER
Called and spoke with patient.  Patient declined to make a HTN appointment with Dr. Naylor.  Patient has clinic number.

## 2021-06-25 NOTE — PROGRESS NOTES
Progress Notes by Christ Cole at 2/17/2017 12:00 PM     Author: Christ Cole Service: -- Author Type: Nurse Practitioner    Filed: 3/2/2017  1:24 PM Encounter Date: 2/17/2017 Status: Signed    : Christ Cole [facility]    Date of Service: 2/17/2017  Primary Provider: No Primary Care Provider    Patient Care Team:  No Primary Care Provider as PCP - General     ______________________________________________________________________     Patient's Pharmacy:    [id number] [place], [place] - [address] [place] [address]  Phone: [phone] Fax: [phone]    [facility] [address] [place] [facility] [address] [place] [address]  Phone: [phone] Fax: [phone]     Patient's Insurance:    Payor: BLUE CROSS / Plan: BLUE CROSS [facility] / Product Type: PPO/POS/FFS /      ______________________________________________________________________     Claire KIERRA Willoughby is 36 y.o. female who comes in today for:  Chief Complaint   Patient presents with   ? Cough     Cough for 1 week today. Has been couging up mucus yellow/green in color in the morning, and then later in the day it is clear. Back and chest pain from coughing so hard. Had congestion prior to getting the cough. No fever. Has sob mainly when being active.       ______________________________________________________________________     Patient Active Problem List   Diagnosis   ? Methylenetetrahydrofolate Reductase Deficiency   ? Hyperlipidemia   ? Vitamin D Deficiency   ? Proteinuria   ? Hypertension      Current Outpatient Prescriptions   Medication Sig   ? aspirin 81 mg chewable tablet Chew 81 mg daily.   ? copper (PARAGARD T 380A) 380 square mm IUD by Intrauterine route. Inserted 2010   ? lisinopril-hydrochlorothiazide (PRINZIDE,ZESTORETIC) 10-12.5 mg per tablet Take 1 tablet by mouth daily.   ? albuterol (PROVENTIL  "HFA;VENTOLIN HFA) 90 mcg/actuation inhaler Inhale 2 puffs every 4 (four) hours as needed for wheezing.   ? azithromycin (ZITHROMAX Z-RED) 250 MG tablet Take 2 tablets (500 mg) on  Day 1,  followed by 1 tablet (250 mg) once daily on Days 2 through 5.      ______________________________________________________________________       History of present illness:  Claire Willoughby is a pleasant 36 year-old female who presents in clinic today for symptoms of cold symptoms and cough x 1 week.  She states that she developed nasal and sinus congestion initially, then her symptoms seemed to move to her chest resulting in a productive cough with dark yellow-green sputum, which would become more clear throughout the day. She describes her cough as harsh, hard coughing expectorating quarter-size amounts of sputum.  She has tried Flonase nasal spray with some benefit, sudafed and mucinex with cough suppressant which have been of benefit, but causes her to have increased coughing in the mornings.  She tends to get a cough or cold annually,  She reports that her daughter was diagnosed with strep throat about 2 weeks ago and that her stepfather has had a viral URI that she has had some exposure to recently.      On review of systems, the patient denies any fevers, chills, night sweats, chest pain or shortness of breath at rest.  Positive for postnasal drip, left sided ear pain, dysphonia, occasional dyspnea on exertion with short walks, wakes due to cough, fatigue and exhaustion, and symptoms as noted in the HPI.  ______________________________________________________________________     Visit Vitals   ? BP (!) 138/94   ? Pulse 80   ? Temp 97.8  F (36.6  C)   ? Resp 20   ? Ht 5' 7.64\" (1.718 m)   ? Wt 184 lb (83.5 kg)   ? SpO2 97%   ? BMI 28.28 kg/m2     Exam:  Patient is comfortable, no apparent distress.  Mood good.  Insight good.  Ears - right ear is normal, left ear has bright red TN inflammation and canal.  Nose exam shows mild " rhinitis.  Throat - erythematous with exudate noted on posterior oropharynx.  Neck is supple, there is no cervical adenopathy.  No thyromegaly.  Heart regular rate and rhythm.  Lungs: right is clear to auscultation, left some scant diffuse crackles.  Respiratory effort is good.     ______________________________________________________________________     Assessment:    1. Acute URI - suspect viral etiology   2. AOM (acute otitis media) - left ear bright red inflamed TM and canal      ______________________________________________________________________    Plan:  Patient Instructions   1. Azithromycin (Z-miranda) take as directed. Rx sent to pharmacy.  2. Continue Flonase Nasal spray, Sudafed, Mucinex as needed for your nasal/sinus, cough symptoms.  3. Steam therapy, like hot shower, also may be helpful to loosen secretions.  4. Ibuprofen or Tylenol for fever, chills, body aches.      Christ Cole, [facility]  Internal Medicine  [facility]    Return if symptoms worsen or fail to improve.

## 2021-06-26 ENCOUNTER — HEALTH MAINTENANCE LETTER (OUTPATIENT)
Age: 41
End: 2021-06-26

## 2021-07-03 NOTE — ADDENDUM NOTE
Addendum Note by Dorian Cox RN at 7/20/2020 11:27 AM     Author: Dorian Cox RN Service: -- Author Type: Registered Nurse    Filed: 7/20/2020 11:27 AM Encounter Date: 7/20/2020 Status: Signed    : Dorian Cox RN (Registered Nurse)    Addended by: DORIAN COX on: 7/20/2020 11:27 AM        Modules accepted: Orders

## 2021-07-03 NOTE — ADDENDUM NOTE
Addendum Note by Haris Modi MD at 3/13/2019  9:40 AM     Author: Haris Modi MD Service: -- Author Type: Physician    Filed: 3/15/2019  7:11 AM Encounter Date: 3/13/2019 Status: Signed    : Haris Modi MD (Physician)    Addended by: HARIS MODI on: 3/15/2019 07:11 AM        Modules accepted: Orders

## 2021-10-16 ENCOUNTER — HEALTH MAINTENANCE LETTER (OUTPATIENT)
Age: 41
End: 2021-10-16

## 2021-10-25 ENCOUNTER — NURSE TRIAGE (OUTPATIENT)
Dept: NURSING | Facility: CLINIC | Age: 41
End: 2021-10-25

## 2021-10-25 DIAGNOSIS — I10 ESSENTIAL HYPERTENSION: ICD-10-CM

## 2021-10-25 RX ORDER — LISINOPRIL AND HYDROCHLOROTHIAZIDE 20; 25 MG/1; MG/1
TABLET ORAL
Qty: 30 TABLET | Refills: 0 | Status: SHIPPED | OUTPATIENT
Start: 2021-10-25 | End: 2021-11-11

## 2021-10-25 NOTE — TELEPHONE ENCOUNTER
"Routing refill request to provider for review/approval because:  Patient needs to be seen because it has been more than 1 year since last office visit.  Blood pressure  Labs not current: NA, CR, Potassium,     Last Written Prescription Date:  11/23/2020  Last Fill Quantity: 45,  # refills: 3   Last office visit provider:  7/24/2020     Requested Prescriptions   Pending Prescriptions Disp Refills     lisinopril-hydrochlorothiazide (ZESTORETIC) 20-25 MG tablet 30 tablet 0     Sig: [LISINOPRIL-HYDROCHLOROTHIAZIDE (PRINZIDE,ZESTORETIC) 20-25 MG PER TABLET] TAKE 1/2 TABLET BY MOUTH DAILY       Diuretics (Including Combos) Protocol Failed - 10/25/2021  4:38 PM        Failed - Blood pressure under 140/90 in past 12 months     BP Readings from Last 3 Encounters:   07/24/20 124/86   10/31/19 128/88                 Failed - Normal serum creatinine on file in past 12 months     Recent Labs   Lab Test 03/13/19  0959   CR 0.81              Failed - Normal serum potassium on file in past 12 months     Recent Labs   Lab Test 03/13/19  0959   POTASSIUM 4.1                    Failed - Normal serum sodium on file in past 12 months     Recent Labs   Lab Test 03/13/19  0959                 Passed - Recent (12 mo) or future (30 days) visit within the authorizing provider's specialty     Patient has had an office visit with the authorizing provider or a provider within the authorizing providers department within the previous 12 mos or has a future within next 30 days. See \"Patient Info\" tab in inbasket, or \"Choose Columns\" in Meds & Orders section of the refill encounter.              Passed - Medication is active on med list        Passed - Patient is age 18 or older        Passed - No active pregancy on record        Passed - No positive pregnancy test in past 12 months       ACE Inhibitors (Including Combos) Protocol Failed - 10/25/2021  4:38 PM        Failed - Blood pressure under 140/90 in past 12 months     BP Readings from " "Last 3 Encounters:   07/24/20 124/86   10/31/19 128/88                 Failed - Normal serum creatinine on file in past 12 months     Recent Labs   Lab Test 03/13/19  0959   CR 0.81       Ok to refill medication if creatinine is low          Failed - Normal serum potassium on file in past 12 months     Recent Labs   Lab Test 03/13/19  0959   POTASSIUM 4.1             Passed - Recent (12 mo) or future (30 days) visit within the authorizing provider's specialty     Patient has had an office visit with the authorizing provider or a provider within the authorizing providers department within the previous 12 mos or has a future within next 30 days. See \"Patient Info\" tab in inbasket, or \"Choose Columns\" in Meds & Orders section of the refill encounter.              Passed - Medication is active on med list        Passed - Patient is age 18 or older        Passed - No active pregnancy on record        Passed - No positive pregnancy test within past 12 months             Shellie Matthews RN 10/25/21 5:36 PM  "

## 2021-10-25 NOTE — TELEPHONE ENCOUNTER
"Patient requests refill of lisinopril-hydrochlorothiazide.  Has 6 tablets left.    PCP visit scheduled on 11/11. Requests supply to bridge her until seen.      [place]ED [facility] - routing to [facility] for review.      Rerun Protocol (10/25/2021 4:35 PM)      Blood pressure under 140/90 in past 12 months        BP Readings from Last 3 Encounters:   07/24/20 124/86   10/31/19 128/88             Normal serum creatinine on file in past 12 months        Recent Labs   Lab Test 03/13/19  0959   CR 0.81          Normal serum potassium on file in past 12 months        Recent Labs   Lab Test 03/13/19  0959   POTASSIUM 4.1                   Normal serum sodium on file in past 12 months        Recent Labs   Lab Test 03/13/19  0959             Recent (12 mo) or future (30 days) visit within the authorizing provider's specialty    Patient has had an office visit with the authorizing provider or a provider within the authorizing providers department within the previous 12 mos or has a future within next 30 days. See \"Patient Info\" tab in inbasket, or \"Choose Columns\" in Meds & Orders section of the refill encounter.           Medication is active on med list          Patient is age 18 or older          No active pregancy on record          No positive pregnancy test in past 12 months          ACE Inhibitors (Including Combos) Protocol Failed    Rerun Protocol (10/25/2021 4:35 PM)      Blood pressure under 140/90 in past 12 months        BP Readings from Last 3 Encounters:   07/24/20 124/86   10/31/19 128/88             Normal serum creatinine on file in past 12 months        Recent Labs   Lab Test 03/13/19  0959   CR 0.81      Ok to refill medication if creatinine is low      Normal serum potassium on file in past 12 months        Recent Labs   Lab Test 03/13/19  0959   POTASSIUM 4.1         Recent (12 mo) or future (30 days) visit within the authorizing provider's specialty    Patient has had an office " "visit with the authorizing provider or a provider within the authorizing providers department within the previous 12 mos or has a future within next 30 days. See \"Patient Info\" tab in inbasket, or \"Choose Columns\" in Meds & Orders section of the refill encounter.           Medication is active on med list          Patient is age 18 or older          No active pregnancy on record          No positive pregnancy test within past 12 months        Kate Welsh RN/[place] Nurse Advisor    "

## 2021-11-11 ENCOUNTER — OFFICE VISIT (OUTPATIENT)
Dept: FAMILY MEDICINE | Facility: CLINIC | Age: 41
End: 2021-11-11
Payer: COMMERCIAL

## 2021-11-11 ENCOUNTER — MYC MEDICAL ADVICE (OUTPATIENT)
Dept: FAMILY MEDICINE | Facility: CLINIC | Age: 41
End: 2021-11-11

## 2021-11-11 VITALS
DIASTOLIC BLOOD PRESSURE: 96 MMHG | SYSTOLIC BLOOD PRESSURE: 150 MMHG | HEIGHT: 67 IN | BODY MASS INDEX: 33.43 KG/M2 | HEART RATE: 89 BPM | TEMPERATURE: 98 F | RESPIRATION RATE: 16 BRPM | OXYGEN SATURATION: 96 % | WEIGHT: 213 LBS

## 2021-11-11 DIAGNOSIS — Z23 HIGH PRIORITY FOR 2019-NCOV VACCINE: ICD-10-CM

## 2021-11-11 DIAGNOSIS — Z12.4 PAP SMEAR FOR CERVICAL CANCER SCREENING: ICD-10-CM

## 2021-11-11 DIAGNOSIS — E72.10 DISORDER OF SULFUR-BEARING AMINO ACID METABOLISM (H): ICD-10-CM

## 2021-11-11 DIAGNOSIS — Z00.00 ROUTINE GENERAL MEDICAL EXAMINATION AT A HEALTH CARE FACILITY: Primary | ICD-10-CM

## 2021-11-11 DIAGNOSIS — N92.0 MENORRHAGIA WITH REGULAR CYCLE: ICD-10-CM

## 2021-11-11 DIAGNOSIS — Z13.220 LIPID SCREENING: ICD-10-CM

## 2021-11-11 DIAGNOSIS — I10 HYPERTENSION, UNSPECIFIED TYPE: ICD-10-CM

## 2021-11-11 LAB
ALBUMIN SERPL-MCNC: 4.1 G/DL (ref 3.5–5)
ALP SERPL-CCNC: 59 U/L (ref 45–120)
ALT SERPL W P-5'-P-CCNC: 41 U/L (ref 0–45)
ANION GAP SERPL CALCULATED.3IONS-SCNC: 11 MMOL/L (ref 5–18)
AST SERPL W P-5'-P-CCNC: 23 U/L (ref 0–40)
BILIRUB SERPL-MCNC: 0.4 MG/DL (ref 0–1)
BUN SERPL-MCNC: 15 MG/DL (ref 8–22)
CALCIUM SERPL-MCNC: 10 MG/DL (ref 8.5–10.5)
CHLORIDE BLD-SCNC: 106 MMOL/L (ref 98–107)
CHOLEST SERPL-MCNC: 142 MG/DL
CO2 SERPL-SCNC: 22 MMOL/L (ref 22–31)
CREAT SERPL-MCNC: 0.85 MG/DL (ref 0.6–1.1)
ERYTHROCYTE [DISTWIDTH] IN BLOOD BY AUTOMATED COUNT: 12.3 % (ref 10–15)
FASTING STATUS PATIENT QL REPORTED: YES
GFR SERPL CREATININE-BSD FRML MDRD: 87 ML/MIN/1.73M2
GLUCOSE BLD-MCNC: 118 MG/DL (ref 70–125)
HCT VFR BLD AUTO: 43.7 % (ref 35–47)
HDLC SERPL-MCNC: 41 MG/DL
HGB BLD-MCNC: 15.5 G/DL (ref 11.7–15.7)
LDLC SERPL CALC-MCNC: 72 MG/DL
MCH RBC QN AUTO: 30.8 PG (ref 26.5–33)
MCHC RBC AUTO-ENTMCNC: 35.5 G/DL (ref 31.5–36.5)
MCV RBC AUTO: 87 FL (ref 78–100)
PLATELET # BLD AUTO: 236 10E3/UL (ref 150–450)
POTASSIUM BLD-SCNC: 3.8 MMOL/L (ref 3.5–5)
PROT SERPL-MCNC: 7.6 G/DL (ref 6–8)
RBC # BLD AUTO: 5.04 10E6/UL (ref 3.8–5.2)
SODIUM SERPL-SCNC: 139 MMOL/L (ref 136–145)
TRIGL SERPL-MCNC: 143 MG/DL
WBC # BLD AUTO: 7.2 10E3/UL (ref 4–11)

## 2021-11-11 PROCEDURE — 87624 HPV HI-RISK TYP POOLED RSLT: CPT | Performed by: FAMILY MEDICINE

## 2021-11-11 PROCEDURE — 85027 COMPLETE CBC AUTOMATED: CPT | Performed by: FAMILY MEDICINE

## 2021-11-11 PROCEDURE — 36415 COLL VENOUS BLD VENIPUNCTURE: CPT | Performed by: FAMILY MEDICINE

## 2021-11-11 PROCEDURE — 80053 COMPREHEN METABOLIC PANEL: CPT | Performed by: FAMILY MEDICINE

## 2021-11-11 PROCEDURE — 90682 RIV4 VACC RECOMBINANT DNA IM: CPT | Performed by: FAMILY MEDICINE

## 2021-11-11 PROCEDURE — 99395 PREV VISIT EST AGE 18-39: CPT | Mod: 25 | Performed by: FAMILY MEDICINE

## 2021-11-11 PROCEDURE — 90471 IMMUNIZATION ADMIN: CPT | Performed by: FAMILY MEDICINE

## 2021-11-11 PROCEDURE — G0123 SCREEN CERV/VAG THIN LAYER: HCPCS | Performed by: FAMILY MEDICINE

## 2021-11-11 PROCEDURE — 91306 COVID-19,PF,MODERNA (18+ YRS BOOSTER .25ML): CPT | Performed by: FAMILY MEDICINE

## 2021-11-11 PROCEDURE — 0064A COVID-19,PF,MODERNA (18+ YRS BOOSTER .25ML): CPT | Performed by: FAMILY MEDICINE

## 2021-11-11 PROCEDURE — 80061 LIPID PANEL: CPT | Performed by: FAMILY MEDICINE

## 2021-11-11 RX ORDER — LISINOPRIL 40 MG/1
40 TABLET ORAL DAILY
Qty: 90 TABLET | Refills: 3 | Status: SHIPPED | OUTPATIENT
Start: 2021-11-11 | End: 2023-02-17

## 2021-11-11 RX ORDER — HYDROCHLOROTHIAZIDE 25 MG/1
25 TABLET ORAL DAILY
Qty: 90 TABLET | Refills: 3 | Status: SHIPPED | OUTPATIENT
Start: 2021-11-11 | End: 2023-02-17

## 2021-11-11 ASSESSMENT — MIFFLIN-ST. JEOR: SCORE: 1678.79

## 2021-11-11 NOTE — TELEPHONE ENCOUNTER
CMT printed immunization report and attached to prepared document. Placed on MD desk. Okay to return to MA once complete.

## 2021-11-11 NOTE — PROGRESS NOTES
SUBJECTIVE:   CC: Claire De La Cruz is an 40 year old woman who presents for preventive health visit.     Patient has been advised of split billing requirements and indicates understanding: Yes  Healthy Habits:     Getting at least 3 servings of Calcium per day:  Yes    Bi-annual eye exam:  Yes    Dental care twice a year:  Yes    Sleep apnea or symptoms of sleep apnea:  None    Diet:  Regular (no restrictions)    Frequency of exercise:  1 day/week    Duration of exercise:  15-30 minutes    Taking medications regularly:  No    Barriers to taking medications:  None    Medication side effects:  None    PHQ-2 Total Score: 0    Additional concerns today:  Yes    Doing well- she is going back to nursing school  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 Menses last 7 days- heavier days, she changes pads every 2 hours. Sometimes is lightheaded on day 2 of her cycle (heaviest day)  She had pelvic US that was normal- of note, her mother had menorrhagia and had hysterectomy 40's  History of MTHFR deficiency- she was previously on copper IUD-  has vasectomy  HTN- on lisinopril 20 mg-hydrochlorothiazide 25 mg daily (she has been taking 1 tablet, full tablet recently) and still notices her ambulatory BP readings are high- her diastolic # was high at dentist (100) recently. She does notice she is having more headaches.     Today's PHQ-2 Score:   PHQ-2 ( 1999 Pfizer) 11/11/2021   Q1: Little interest or pleasure in doing things 0   Q2: Feeling down, depressed or hopeless 0   PHQ-2 Score 0   Q1: Little interest or pleasure in doing things Not at all   Q2: Feeling down, depressed or hopeless Not at all   PHQ-2 Score 0       Abuse: Current or Past (Physical, Sexual or Emotional) - No  Do you feel  Patient called stating that she has the following symptoms of UTI:  Frequency, pain with urination, cloudy and odorous urine, blood in urine.  Patient stated that this started last night.  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  Patient is asking if orders could be placed for a urine culture.    Please call patient to advise.    Pharmacy is loaded and verified.   "safe in your environment? Yes    Have you ever done Advance Care Planning? (For example, a Health Directive, POLST, or a discussion with a medical provider or your loved ones about your wishes): No, advance care planning information given to patient to review.  Patient plans to discuss their wishes with loved ones or provider.  She is planning on doing will with  with her  eventually and will also do health care directive at that time.    Social History     Tobacco Use     Smoking status: Never Smoker     Smokeless tobacco: Never Used   Substance Use Topics     Alcohol use: Yes     Comment: Alcoholic Drinks/day: Seldom     If you drink alcohol do you typically have >3 drinks per day or >7 drinks per week? No    Alcohol Use 11/11/2021   Prescreen: >3 drinks/day or >7 drinks/week? No       Reviewed orders with patient.  Reviewed health maintenance and updated orders accordingly - Yes  Lab work is in process    Breast Cancer Screening:    Breast CA Risk Assessment (FHS-7) 11/11/2021   Do you have a family history of breast, colon, or ovarian cancer? No / Unknown       click delete button to remove this line now  Patient under 40 years of age: Routine Mammogram Screening not recommended.   Pertinent mammograms are reviewed under the imaging tab.    History of abnormal Pap smear: yes, s/p LEEP. No history of abnormal pap smear since that time  PAP / HPV 7/24/2015   PAP Negative for squamous intraepithelial lesion or malignancy  Electronically signed by Chyna Brewer CT (ASCP) on 7/30/2015 at  2:22 PM       Reviewed and updated as needed this visit by clinical staff  Tobacco  Allergies  Meds             Reviewed and updated as needed this visit by Provider  Tobacco  Allergies  Meds  Problems  Med Hx  Surg Hx  Fam Hx             OBJECTIVE:   BP (!) 150/96   Pulse 89   Temp 98  F (36.7  C) (Oral)   Resp 16   Ht 1.702 m (5' 7\")   Wt 96.6 kg (213 lb)   LMP 10/18/2021 (Exact Date)   SpO2 96%   " Breastfeeding No   BMI 33.36 kg/m    Physical Exam  General: Alert and oriented, in NAD.  Eyes: PERRL, EOMI, sclera normal.  HENT: Normocephalic, no pharyngeal erythema, MMM.  Neck: Supple, no adenopathy.  Heart: Normal S1 and S2, regular rhythm. No murmurs, gallops, rubs.  Lungs: CTA bilaterally, no wheezes, no crackles, no rhonchi.  Abdomen: Soft, non-tender, non-distended   Neuro: No focal deficits noted.   (female): External genitalia is without lesions. Introitus is normal, vaginal walls pink and moist without lesions or evidence of trauma. No cervical lesions or discharge  Skin: Warm and well perfused, no rashes noted.  Psych: Normal mood and affect.      Diagnostic Test Results:  Labs reviewed in Epic    ASSESSMENT/PLAN:     Claire was seen today for physical, gyn exam and imm/inj.    Diagnoses and all orders for this visit:    Routine general medical examination at a health care facility    Lipid screening  -     Lipid panel reflex to direct LDL Fasting; Future  -     Lipid panel reflex to direct LDL Fasting    Hypertension, unspecified type: BP not at goal- based on elevated reading today as well as elevated ambulatory readings, increase to 40 mg lisinopril and continue 25 mg hydrochlorothiazide. Patient will plan to check BP at work and home and notify me of her readings via [employer].   -     CBC with platelets; Future  -     Comprehensive metabolic panel (BMP + Alb, Alk Phos, ALT, AST, Total. Bili, TP); Future  -     lisinopril (ZESTRIL) 40 MG tablet; Take 1 tablet (40 mg) by mouth daily  -     hydrochlorothiazide (HYDRODIURIL) 25 MG tablet; Take 1 tablet (25 mg) by mouth daily  -     CBC with platelets  -     Comprehensive metabolic panel (BMP + Alb, Alk Phos, ALT, AST, Total. 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  with vasectomy.    High priority for 2019-nCoV vaccine: High risk group with HTN.  -      "COVID-19,PF,MODERNA (18+ Yrs BOOSTER .25mL)    Pap smear for cervical cancer screening: Abnormal pap s/p LEEP years ago- no history of abnormal pap since then- pap with cotesting done today.  -     PAP screen with HPV - recommended age 30 - 65 years    Menorrhagia with regular cycle: Check hemoglobin. Pelvic US 10/31/19 normal, except simple 1.8 cm left ovarian cyst.  -     Ob/Gyn Referral; Future    Other orders  -     [place] RIV4 (FLUBLOK) VACCINE RECOMBINANT DNA PRSRV ANTIBIO FREE, IM      Patient has been advised of split billing requirements and indicates understanding: Yes  COUNSELING:  Reviewed preventive health counseling, as reflected in patient instructions    Estimated body mass index is 33.36 kg/m  as calculated from the following:    Height as of this encounter: 1.702 m (5' 7\").    Weight as of this encounter: 96.6 kg (213 lb).    Weight management plan: Discussed healthy diet and exercise guidelines. Offered referral to weight management clinic.    She reports that she has never smoked. She has never used smokeless tobacco.      Counseling Resources:  ATP IV Guidelines  Pooled Cohorts Equation Calculator  Breast Cancer Risk Calculator  BRCA-Related Cancer Risk Assessment: FHS-7 Tool  FRAX Risk Assessment  ICSI Preventive Guidelines  Dietary Guidelines for Americans, 2010  USDA's MyPlate  ASA Prophylaxis  Lung CA Screening    Sharifa Naylor MD  [facility]  "

## 2021-11-12 ENCOUNTER — TELEPHONE (OUTPATIENT)
Dept: FAMILY MEDICINE | Facility: CLINIC | Age: 41
End: 2021-11-12
Payer: COMMERCIAL

## 2021-11-12 DIAGNOSIS — Z23 IMMUNIZATION DUE: Primary | ICD-10-CM

## 2021-11-12 DIAGNOSIS — Z76.0 ENCOUNTER FOR MEDICATION REFILL: Primary | ICD-10-CM

## 2021-11-12 RX ORDER — LISINOPRIL AND HYDROCHLOROTHIAZIDE 20; 25 MG/1; MG/1
TABLET ORAL
Qty: 45 TABLET | OUTPATIENT
Start: 2021-11-12

## 2021-11-14 NOTE — TELEPHONE ENCOUNTER
Chart reviewed. Please review findings below.     MMR was given [birth year]/1/1995. No record for Varicella vaccination.   
Placed order for titers. Will send patient mychart message.  
Reason for Call: Request for an order or referral:    Order or referral being requested: titer for MMR and chicken pox    Date needed: before 18th    Has the patient been seen by the PCP for this problem? YES    Additional comments: Patient states she can't find her records when she receive the MMR and chicken pox. Can Dr. Naylor put in lab orders? She needs results by 18th for school.     Phone number Patient can be reached at:  Cell number on file:    Telephone Information:   Mobile [phone]       Best Time:  any    Can we leave a detailed message on this number?  YES    Call taken on 11/12/2021 at 1:58 PM by Jammie Leung  
Statement Selected

## 2021-11-15 ENCOUNTER — LAB (OUTPATIENT)
Dept: LAB | Facility: CLINIC | Age: 41
End: 2021-11-15
Payer: COMMERCIAL

## 2021-11-15 DIAGNOSIS — Z23 IMMUNIZATION DUE: ICD-10-CM

## 2021-11-15 LAB
HUMAN PAPILLOMA VIRUS 16 DNA: NEGATIVE
HUMAN PAPILLOMA VIRUS 18 DNA: NEGATIVE
HUMAN PAPILLOMA VIRUS FINAL DIAGNOSIS: NORMAL
HUMAN PAPILLOMA VIRUS OTHER HR: NEGATIVE

## 2021-11-15 PROCEDURE — 86762 RUBELLA ANTIBODY: CPT

## 2021-11-15 PROCEDURE — 86787 VARICELLA-ZOSTER ANTIBODY: CPT

## 2021-11-15 PROCEDURE — 36415 COLL VENOUS BLD VENIPUNCTURE: CPT

## 2021-11-16 LAB
RUBV IGG SERPL QL IA: 3.48 INDEX
RUBV IGG SERPL QL IA: POSITIVE
VZV IGG SER QL IA: 803.1 INDEX
VZV IGG SER QL IA: POSITIVE

## 2021-11-17 DIAGNOSIS — Z23 IMMUNIZATION DUE: Primary | ICD-10-CM

## 2021-11-17 LAB
MEV IGG SER IA-ACNC: 87.1 AU/ML
MEV IGG SER IA-ACNC: POSITIVE
MUMPS ANTIBODY IGG INSTRUMENT VALUE: 73 AU/ML
MUV IGG SER QL IA: POSITIVE

## 2021-11-17 PROCEDURE — 86735 MUMPS ANTIBODY: CPT | Performed by: FAMILY MEDICINE

## 2021-11-17 PROCEDURE — 36415 COLL VENOUS BLD VENIPUNCTURE: CPT | Performed by: FAMILY MEDICINE

## 2021-11-17 PROCEDURE — 86765 RUBEOLA ANTIBODY: CPT | Performed by: FAMILY MEDICINE

## 2021-11-18 ENCOUNTER — VIRTUAL VISIT (OUTPATIENT)
Dept: FAMILY MEDICINE | Facility: CLINIC | Age: 41
End: 2021-11-18
Payer: COMMERCIAL

## 2021-11-18 DIAGNOSIS — N39.0 URINARY TRACT INFECTION WITHOUT HEMATURIA, SITE UNSPECIFIED: Primary | ICD-10-CM

## 2021-11-18 DIAGNOSIS — R30.0 DYSURIA: ICD-10-CM

## 2021-11-18 DIAGNOSIS — I10 ESSENTIAL HYPERTENSION: ICD-10-CM

## 2021-11-18 DIAGNOSIS — E55.9 VITAMIN D DEFICIENCY: ICD-10-CM

## 2021-11-18 PROCEDURE — 99214 OFFICE O/P EST MOD 30 MIN: CPT | Mod: 95 | Performed by: INTERNAL MEDICINE

## 2021-11-18 RX ORDER — NITROFURANTOIN 25; 75 MG/1; MG/1
100 CAPSULE ORAL 2 TIMES DAILY
Qty: 10 CAPSULE | Refills: 1 | Status: SHIPPED | OUTPATIENT
Start: 2021-11-18 | End: 2022-10-05

## 2021-11-18 NOTE — PROGRESS NOTES
Claire is a 40 year old who is being evaluated via a billable video visit.      How would you like to obtain your AVS? [employer]  cell phone: [phone]  Will anyone else be joining your telephone visit? No      Subjective   Claire is a 40 year old who presents for the following health issues     HPI     Chief Complaint:         HPI:   Patient Claire De La Cruz is a very pleasant 40 year old female with history of hypertension, vitamin d deficiency today for telephone visit for evaluation of recent dysuria symptoms concerning for acute UTI. no chest pain,headaches, fever or chills at this time.      Current Medications:     Current Outpatient Medications   Medication Sig Dispense Refill     hydrochlorothiazide (HYDRODIURIL) 25 MG tablet Take 1 tablet (25 mg) by mouth daily 90 tablet 3     lisinopril (ZESTRIL) 40 MG tablet Take 1 tablet (40 mg) by mouth daily 90 tablet 3     nitroFURantoin macrocrystal-monohydrate (MACROBID) 100 MG capsule Take 1 capsule (100 mg) by mouth 2 times daily 10 capsule 1         Allergies:    No Known Allergies         Past Medical History:     Past Medical History:   Diagnosis Date     Abnormal Pap smear of cervix      Hypertension      Postpartum hemorrhage 2010         Past Surgical History:     Past Surgical History:   Procedure Laterality Date     BIOPSY CERVICAL, LOCAL EXCISION, SINGLE/MULTIPLE  2009    unknown dx.  normal paps since.     CHOLECYSTECTOMY           Family Medical History:     Family History   Problem Relation Age of Onset     No Known Problems Mother      Hypertension Father      Diabetes Father      Clotting Disorder Maternal Grandfather      Breast Cancer No family hx of          Social History:     Social History     Socioeconomic History     Marital status: Single     Spouse name: Not on file     Number of children: Not on file     Years of education: Not on file     Highest education level: Not on file   Occupational History     Not on file   Tobacco Use      Smoking status: Never Smoker     Smokeless tobacco: Never Used   Substance and Sexual Activity     Alcohol use: Yes     Comment: Alcoholic Drinks/day: Seldom     Drug use: No     Sexual activity: Yes     Partners: Male     Birth control/protection: I.U.D.   Other Topics Concern     Not on file   Social History Narrative     Not on file     Social Determinants of Health     Financial Resource Strain: Not on file   Food Insecurity: Not on file   Transportation Needs: Not on file   Physical Activity: Not on file   Stress: Not on file   Social Connections: Not on file   Intimate Partner Violence: Not on file   Housing Stability: Not on file           Review of System:     Constitutional: Negative for fever or chills  Skin: Negative for rashes  Ears/Nose/Throat: Negative for nasal congestion, sore throat  Respiratory: No shortness of breath, dyspnea on exertion, cough, or hemoptysis  Cardiovascular: Negative for chest pain  Gastrointestinal: Negative for nausea, vomiting  Genitourinary: positive for dysuria concerning for acute UTI  Musculoskeletal: Negative for myalgias  Neurologic: Negative for headaches  Psychiatric: Negative for depression, anxiety  Hematologic/Lymphatic/Immunologic: Negative  Endocrine: Negative  Behavioral: Negative for tobacco use       Physical Exam:   There were no vitals taken for this visit.    RESP: no cough over the phone  NEURO: Alert & Oriented x 3.   PSYCH: mentation appears normal, affect normal        Diagnostic Test Results:     Diagnostic Test Results:  Labs reviewed in Epic    ASSESSMENT/PLAN:       Claire was seen today for uti.    Diagnoses and all orders for this visit:  Dysuria  Urinary tract infection without hematuria, site unspecified  -     nitroFURantoin macrocrystal-monohydrate (MACROBID) 100 MG capsule; Take 1 capsule (100 mg) by mouth 2 times daily    Vitamin D deficiency  - stable. Continue current therapy    Hypertension  - stable. Continue current  therapy          Follow Up Plan:     Patient is instructed to return to Internal Medicine clinic for follow-up visit in 1 week.    Phone call duration: 30 minutes    Leslie Escalona MD  Internal Medicine  [facility]

## 2021-11-22 ENCOUNTER — PATIENT OUTREACH (OUTPATIENT)
Dept: FAMILY MEDICINE | Facility: CLINIC | Age: 41
End: 2021-11-22
Payer: COMMERCIAL

## 2021-11-22 LAB
BKR LAB AP GYN ADEQUACY: NORMAL
BKR LAB AP GYN INTERPRETATION: NORMAL
BKR LAB AP HPV REFLEX: NORMAL
BKR LAB AP LMP: NORMAL
BKR LAB AP PREVIOUS ABNORMAL: NORMAL
PATH REPORT.COMMENTS IMP SPEC: NORMAL
PATH REPORT.COMMENTS IMP SPEC: NORMAL
PATH REPORT.RELEVANT HX SPEC: NORMAL

## 2022-01-12 ENCOUNTER — APPOINTMENT (OUTPATIENT)
Dept: MRI IMAGING | Facility: CLINIC | Age: 42
End: 2022-01-12
Attending: EMERGENCY MEDICINE
Payer: COMMERCIAL

## 2022-01-12 ENCOUNTER — HOSPITAL ENCOUNTER (EMERGENCY)
Facility: CLINIC | Age: 42
Discharge: HOME | End: 2022-01-12
Attending: EMERGENCY MEDICINE | Admitting: EMERGENCY MEDICINE
Payer: COMMERCIAL

## 2022-01-12 VITALS
SYSTOLIC BLOOD PRESSURE: 142 MMHG | BODY MASS INDEX: 31.83 KG/M2 | HEART RATE: 58 BPM | HEIGHT: 68 IN | TEMPERATURE: 98 F | RESPIRATION RATE: 18 BRPM | DIASTOLIC BLOOD PRESSURE: 70 MMHG | OXYGEN SATURATION: 98 % | WEIGHT: 210 LBS

## 2022-01-12 DIAGNOSIS — R42 VERTIGO: ICD-10-CM

## 2022-01-12 DIAGNOSIS — E83.42 HYPOMAGNESEMIA: ICD-10-CM

## 2022-01-12 LAB
ANION GAP SERPL CALCULATED.3IONS-SCNC: 12 MMOL/L (ref 5–18)
ATRIAL RATE - MUSE: 65 BPM
BASOPHILS # BLD AUTO: 0 10E3/UL (ref 0–0.2)
BASOPHILS NFR BLD AUTO: 0 %
BUN SERPL-MCNC: 12 MG/DL (ref 8–22)
CALCIUM SERPL-MCNC: 10 MG/DL (ref 8.5–10.5)
CHLORIDE BLD-SCNC: 106 MMOL/L (ref 98–107)
CO2 SERPL-SCNC: 23 MMOL/L (ref 22–31)
CREAT SERPL-MCNC: 0.8 MG/DL (ref 0.6–1.1)
DIASTOLIC BLOOD PRESSURE - MUSE: NORMAL MMHG
EOSINOPHIL # BLD AUTO: 0 10E3/UL (ref 0–0.7)
EOSINOPHIL NFR BLD AUTO: 0 %
ERYTHROCYTE [DISTWIDTH] IN BLOOD BY AUTOMATED COUNT: 12.9 % (ref 10–15)
GFR SERPL CREATININE-BSD FRML MDRD: >90 ML/MIN/1.73M2
GLUCOSE BLD-MCNC: 112 MG/DL (ref 70–125)
HCT VFR BLD AUTO: 42.3 % (ref 35–47)
HGB BLD-MCNC: 14.5 G/DL (ref 11.7–15.7)
IMM GRANULOCYTES # BLD: 0 10E3/UL
IMM GRANULOCYTES NFR BLD: 1 %
INTERPRETATION ECG - MUSE: NORMAL
LYMPHOCYTES # BLD AUTO: 1.9 10E3/UL (ref 0.8–5.3)
LYMPHOCYTES NFR BLD AUTO: 28 %
MAGNESIUM SERPL-MCNC: 1.7 MG/DL (ref 1.8–2.6)
MCH RBC QN AUTO: 30.9 PG (ref 26.5–33)
MCHC RBC AUTO-ENTMCNC: 34.3 G/DL (ref 31.5–36.5)
MCV RBC AUTO: 90 FL (ref 78–100)
MONOCYTES # BLD AUTO: 0.5 10E3/UL (ref 0–1.3)
MONOCYTES NFR BLD AUTO: 8 %
NEUTROPHILS # BLD AUTO: 4.2 10E3/UL (ref 1.6–8.3)
NEUTROPHILS NFR BLD AUTO: 63 %
NRBC # BLD AUTO: 0 10E3/UL
NRBC BLD AUTO-RTO: 0 /100
P AXIS - MUSE: 36 DEGREES
PLATELET # BLD AUTO: 217 10E3/UL (ref 150–450)
POTASSIUM BLD-SCNC: 4.1 MMOL/L (ref 3.5–5)
PR INTERVAL - MUSE: 162 MS
QRS DURATION - MUSE: 84 MS
QT - MUSE: 416 MS
QTC - MUSE: 432 MS
R AXIS - MUSE: 16 DEGREES
RBC # BLD AUTO: 4.7 10E6/UL (ref 3.8–5.2)
SODIUM SERPL-SCNC: 141 MMOL/L (ref 136–145)
SYSTOLIC BLOOD PRESSURE - MUSE: NORMAL MMHG
T AXIS - MUSE: 37 DEGREES
TSH SERPL DL<=0.005 MIU/L-ACNC: 1.83 UIU/ML (ref 0.3–5)
VENTRICULAR RATE- MUSE: 65 BPM
WBC # BLD AUTO: 6.6 10E3/UL (ref 4–11)

## 2022-01-12 PROCEDURE — 250N000013 HC RX MED GY IP 250 OP 250 PS 637: Performed by: EMERGENCY MEDICINE

## 2022-01-12 PROCEDURE — 70549 MR ANGIOGRAPH NECK W/O&W/DYE: CPT

## 2022-01-12 PROCEDURE — A9585 GADOBUTROL INJECTION: HCPCS | Performed by: EMERGENCY MEDICINE

## 2022-01-12 PROCEDURE — 255N000002 HC RX 255 OP 636: Performed by: EMERGENCY MEDICINE

## 2022-01-12 PROCEDURE — 93005 ELECTROCARDIOGRAM TRACING: CPT | Performed by: EMERGENCY MEDICINE

## 2022-01-12 PROCEDURE — 99285 EMERGENCY DEPT VISIT HI MDM: CPT | Mod: 25

## 2022-01-12 PROCEDURE — 83735 ASSAY OF MAGNESIUM: CPT | Performed by: EMERGENCY MEDICINE

## 2022-01-12 PROCEDURE — 80048 BASIC METABOLIC PNL TOTAL CA: CPT | Performed by: EMERGENCY MEDICINE

## 2022-01-12 PROCEDURE — 70553 MRI BRAIN STEM W/O & W/DYE: CPT

## 2022-01-12 PROCEDURE — 70544 MR ANGIOGRAPHY HEAD W/O DYE: CPT

## 2022-01-12 PROCEDURE — 84443 ASSAY THYROID STIM HORMONE: CPT | Performed by: EMERGENCY MEDICINE

## 2022-01-12 PROCEDURE — 85025 COMPLETE CBC W/AUTO DIFF WBC: CPT | Performed by: EMERGENCY MEDICINE

## 2022-01-12 PROCEDURE — 36415 COLL VENOUS BLD VENIPUNCTURE: CPT | Performed by: EMERGENCY MEDICINE

## 2022-01-12 RX ORDER — MAGNESIUM OXIDE 400 MG/1
400 TABLET ORAL ONCE
Status: DISCONTINUED | OUTPATIENT
Start: 2022-01-12 | End: 2022-01-12 | Stop reason: HOSPADM

## 2022-01-12 RX ORDER — GADOBUTROL 604.72 MG/ML
9.5 INJECTION INTRAVENOUS ONCE
Status: COMPLETED | OUTPATIENT
Start: 2022-01-12 | End: 2022-01-12

## 2022-01-12 RX ORDER — MECLIZINE HCL 12.5 MG 12.5 MG/1
25 TABLET ORAL 4 TIMES DAILY PRN
Qty: 30 TABLET | Refills: 0 | Status: SHIPPED | OUTPATIENT
Start: 2022-01-12 | End: 2022-10-05

## 2022-01-12 RX ORDER — MECLIZINE HYDROCHLORIDE 25 MG/1
25 TABLET ORAL ONCE
Status: COMPLETED | OUTPATIENT
Start: 2022-01-12 | End: 2022-01-12

## 2022-01-12 RX ADMIN — GADOBUTROL 9.5 ML: 604.72 INJECTION INTRAVENOUS at 12:53

## 2022-01-12 RX ADMIN — MECLIZINE HYDROCHLORIDE 25 MG: 25 TABLET ORAL at 09:01

## 2022-01-12 ASSESSMENT — ENCOUNTER SYMPTOMS
NECK PAIN: 0
FEVER: 0
DIZZINESS: 1
COUGH: 0
HEADACHES: 0

## 2022-01-12 ASSESSMENT — MIFFLIN-ST. JEOR: SCORE: 1676.05

## 2022-01-12 NOTE — ED PROVIDER NOTES
EMERGENCY DEPARTMENT ENCOUNTER      NAME: Claire De La Cruz  AGE: 41 year old female  YOB: 1980  MRN: [id number]  EVALUATION DATE & TIME: 1/12/2022  8:22 AM    PCP: Sharifa Naylor    ED PROVIDER: Samuel Rodriguez MD    Chief Complaint   Patient presents with     Feeling off Balance     FINAL IMPRESSION:  1. Vertigo    2. Hypomagnesemia      ED COURSE & MEDICAL DECISION MAKING:    Pertinent Labs & Imaging studies reviewed. (See chart for details)  41 year old female presents to the Emergency Department for evaluation of vertiginous symptoms.  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  Not responsive to medication.  Risk factors for central mediated process including hypertension and a family history of early stroke with her brother having a stroke at age 37.  Symptoms are escalating and instead of improving since leaving the ship.  At times significant symptoms.  Televisit earlier this week start the patient on scopolamine which has not helped her symptoms.  Had PCR negative COVID testing yesterday.  On examination patient was overall well-appearing.  I did not appreciate nystagmus or truncal ataxia.  Gross motor and sensory function appear to be intact although patient was noted to be unsteady with ambulation and with standing.  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  In light of her symptoms worsening despite appropriate medical therapy, her risk factors hypertension and early stroke I did recommend we pursue work-up to rule out alternative pathology.  This was discussed with the patient who was in support of the plan of care.  We will proceed with ECG screening laboratory testing and MRI imaging for central mediated process if negative would anticipate discharge home with continued medication management and follow-up in our vertigo clinic.    1:37 PM  MRI imaging was negative.  Some subtle improvement of symptoms but still having vertiginous symptoms.  I think given  her otherwise negative work-up patient is okay for discharge home with planned outpatient management.  She received magnesium supplementation for mildly depressed magnesium which I think is unrelated to her current symptomatology.  Referral placed to her vestibular clinic.  We will provide a course of meclizine for treatment of her symptoms.  Reviewed return precautions prior to discharge patient was counseled this plan of care.    8:28 AM I met with the patient, obtained history, performed an initial exam, and discussed options and plan for diagnostics and treatment here in the ED. PPE worn: N95 mask, eye protection, gloves  1:23 PM I rechecked and updated the patient    At the conclusion of the encounter I discussed the results of all of the tests and the disposition. The questions were answered. The patient or family acknowledged understanding and was agreeable with the care plan.     MEDICATIONS GIVEN IN THE EMERGENCY:  Medications   magnesium oxide (MAG-OX) tablet 400 mg (has no administration in time range)   meclizine (ANTIVERT) tablet 25 mg (25 mg Oral Given 1/12/22 0901)   gadobutrol (GADAVIST) injection 9.5 mL (9.5 mLs Intravenous Given 1/12/22 1253)       NEW PRESCRIPTIONS STARTED AT TODAY'S ER VISIT  New Prescriptions    MECLIZINE (ANTIVERT) 12.5 MG TABLET    Take 2 tablets (25 mg) by mouth 4 times daily as needed for dizziness       ===============================================================    HPI    Patient information was obtained from: Patient     Use of : N/A     Claire De La Cruz is a 41 year old female with a pertinent history of hypertension (on antihypertensive medication) who presents to this ED by walk in for evaluation of dizziness and balance issues.    The patient reports being on a cruise ship recently for a duration of several days. The patient states that she was feeling mostly fine on the ship with one mild episode of motion sickness that was self-resolving. The patient  reports that once she went off the ship, she started feeling dizzy and off balance; sometimes these symptoms become severe. Symptoms were particularly bad on Sunday (3 days ago). The patient states that the symptoms got better on Monday but became worse again. The patient reports having a virtual visit earlier this week and she was prescribed scopolamine which has caused mouth dryness, but has had no positive impact on her symptoms. The patient reports getting a COVID-19 PCR test that was negative yesterday. The patient is vaccinated for COVID-19.     The patient reports that the symptoms are okay when sitting down, but she can still feel it. However, the patient also mentions that when walking, driving, or moving the symptoms are unbearable, causing several episodes of almost falling or driving off the road. The patient denies headaches and neck pain. Patient denies recent trauma. The patient has never experienced this before.     The patient notes that her brother had a stroke at the age of 37, but patient is unsure of the cause. No other family history of strokes, heart attacks, or blood clots. The patient otherwise denies fevers, cough, or any other symptoms or complaints at this time.    REVIEW OF SYSTEMS   Review of Systems   Constitutional: Negative for fever.   Respiratory: Negative for cough.    Musculoskeletal: Positive for gait problem (off balance). Negative for neck pain.   Neurological: Positive for dizziness. Negative for headaches.   All other systems reviewed and are negative.      PAST MEDICAL HISTORY:  Past Medical History:   Diagnosis Date     Abnormal Pap smear of cervix      JE II (cervical intraepithelial neoplasia II) 11/14/2008    10/14/08 ASCUS with positive HPV 11/14/08 Colpo JE II 12/3/08 LEEP mild squamous atypia, ECC neg 4/22/11 NIL 7/25/15 NIL pap, neg HPV 11/11/21 NIL pap, neg HPV. Plan: cotest in 1 year     Hypertension      Postpartum hemorrhage 2010       PAST SURGICAL  "HISTORY:  Past Surgical History:   Procedure Laterality Date     BIOPSY CERVICAL, LOCAL EXCISION, SINGLE/MULTIPLE  2009    unknown dx.  normal paps since.     CHOLECYSTECTOMY             CURRENT MEDICATIONS:    meclizine (ANTIVERT) 12.5 MG tablet  hydrochlorothiazide (HYDRODIURIL) 25 MG tablet  lisinopril (ZESTRIL) 40 MG tablet  nitroFURantoin macrocrystal-monohydrate (MACROBID) 100 MG capsule  scopolamine (TRANSDERM) 1 MG/3DAYS 72 hr patch        ALLERGIES:  No Known Allergies    FAMILY HISTORY:  Family History   Problem Relation Age of Onset     No Known Problems Mother      Hypertension Father      Diabetes Father      Clotting Disorder Maternal Grandfather      Breast Cancer No family hx of        SOCIAL HISTORY:   Social History     Socioeconomic History     Marital status: Single     Spouse name: None     Number of children: None     Years of education: None     Highest education level: None   Occupational History     None   Tobacco Use     Smoking status: Never Smoker     Smokeless tobacco: Never Used   Substance and Sexual Activity     Alcohol use: Yes     Comment: Alcoholic Drinks/day: Seldom     Drug use: No     Sexual activity: Yes     Partners: Male     Birth control/protection: I.U.D.   Other Topics Concern     None   Social History Narrative     None     Social Determinants of Health     Financial Resource Strain: Not on file   Food Insecurity: Not on file   Transportation Needs: Not on file   Physical Activity: Not on file   Stress: Not on file   Social Connections: Not on file   Intimate Partner Violence: Not on file   Housing Stability: Not on file       VITALS:  BP (!) 142/70   Pulse 58   Temp 98  F (36.7  C) (Oral)   Resp 18   Ht 1.727 m (5' 8\")   Wt 95.3 kg (210 lb)   LMP 12/22/2021 (Approximate)   SpO2 98%   BMI 31.93 kg/m      PHYSICAL EXAM    PHYSICAL EXAM    Constitutional: Well developed, Well nourished, anxious and tearful  HENT: Normocephalic, Atraumatic, Bilateral external ears " normal, Oropharynx normal, mucous membranes moist, Nose normal. Neck-  Normal range of motion, No tenderness, Supple, No stridor.   Eyes: PERRL, EOMI, Conjunctiva normal, No discharge.   Respiratory: Normal breath sounds, No respiratory distress, No wheezing, Speaks full sentences easily. No cough.   Cardiovascular: Normal heart rate, Regular rhythm, No murmurs Chest wall nontender.    GI:  Soft, No tenderness, No masses, No flank tenderness. No rebound or guarding.  : No cva tenderness    Musculoskeletal: 2+ DP pulses. No edema. No cyanosis. Good range of motion in all major joints. No tenderness to palpation. No tenderness of the CTLS spine.   Integument: Warm, Dry, No erythema, No rash. No petechiae.   Neurologic: Alert & oriented x 3, cranial nerves appear to be functioning normally.  There is no appreciable nystagmus.  Visual fields grossly normal.  Extraocular movements intact.  No upper or lower motor or sensory deficits identified on clinical examination.  No cerebellar ataxia although patient with ambulation and standing is noted to be quite unsteady.  Psychiatric: Anxious and tearful    LAB:  All pertinent labs reviewed and interpreted.  Results for orders placed or performed during the hospital encounter of 01/12/22   MR Brain w/o & w Contrast    Impression    IMPRESSION:  HEAD MRI:   1.  No acute infarct, acute intracranial hemorrhage, or intracranial mass.    HEAD MRA:   1.  No aneurysm, high flow AVM or significant stenosis identified.  2.  Variant [place] of [place] anatomy as above.    NECK MRA:  1.  No significant stenosis of the bilateral internal carotid arteries based on NASCET criteria.   MRA Brain ([place] of [place]) wo Contrast    Impression    IMPRESSION:  HEAD MRI:   1.  No acute infarct, acute intracranial hemorrhage, or intracranial mass.    HEAD MRA:   1.  No aneurysm, high flow AVM or significant stenosis identified.  2.  Variant [place] of [place] anatomy as above.    NECK MRA:  1.  No  significant stenosis of the bilateral internal carotid arteries based on NASCET criteria.   MRA Neck (Carotids) wo & w Contrast    Impression    IMPRESSION:  HEAD MRI:   1.  No acute infarct, acute intracranial hemorrhage, or intracranial mass.    HEAD MRA:   1.  No aneurysm, high flow AVM or significant stenosis identified.  2.  Variant [place] of [place] anatomy as above.    NECK MRA:  1.  No significant stenosis of the bilateral internal carotid arteries based on NASCET criteria.   Basic metabolic panel   Result Value Ref Range    Sodium 141 136 - 145 mmol/L    Potassium 4.1 3.5 - 5.0 mmol/L    Chloride 106 98 - 107 mmol/L    Carbon Dioxide (CO2) 23 22 - 31 mmol/L    Anion Gap 12 5 - 18 mmol/L    Urea Nitrogen 12 8 - 22 mg/dL    Creatinine 0.80 0.60 - 1.10 mg/dL    Calcium 10.0 8.5 - 10.5 mg/dL    Glucose 112 70 - 125 mg/dL    GFR Estimate >90 >60 mL/min/1.73m2   TSH with free T4 reflex   Result Value Ref Range    TSH 1.83 0.30 - 5.00 uIU/mL   Result Value Ref Range    Magnesium 1.7 (L) 1.8 - 2.6 mg/dL   CBC with platelets and differential   Result Value Ref Range    WBC Count 6.6 4.0 - 11.0 10e3/uL    RBC Count 4.70 3.80 - 5.20 10e6/uL    Hemoglobin 14.5 11.7 - 15.7 g/dL    Hematocrit 42.3 35.0 - 47.0 %    MCV 90 78 - 100 fL    MCH 30.9 26.5 - 33.0 pg    MCHC 34.3 31.5 - 36.5 g/dL    RDW 12.9 10.0 - 15.0 %    Platelet Count 217 150 - 450 10e3/uL    % Neutrophils 63 %    % Lymphocytes 28 %    % Monocytes 8 %    % Eosinophils 0 %    % Basophils 0 %    % Immature Granulocytes 1 %    NRBCs per 100 WBC 0 <1 /100    Absolute Neutrophils 4.2 1.6 - 8.3 10e3/uL    Absolute Lymphocytes 1.9 0.8 - 5.3 10e3/uL    Absolute Monocytes 0.5 0.0 - 1.3 10e3/uL    Absolute Eosinophils 0.0 0.0 - 0.7 10e3/uL    Absolute Basophils 0.0 0.0 - 0.2 10e3/uL    Absolute Immature Granulocytes 0.0 <=0.4 10e3/uL    Absolute NRBCs 0.0 10e3/uL       RADIOLOGY:  Reviewed all pertinent imaging. Please see official radiology report.  MRA Brain ([place]  of [place]) wo Contrast   Final Result   IMPRESSION:   HEAD MRI:    1.  No acute infarct, acute intracranial hemorrhage, or intracranial mass.      HEAD MRA:    1.  No aneurysm, high flow AVM or significant stenosis identified.   2.  Variant [place] of [place] anatomy as above.      NECK MRA:   1.  No significant stenosis of the bilateral internal carotid arteries based on NASCET criteria.      MRA Neck (Carotids) wo & w Contrast   Final Result   IMPRESSION:   HEAD MRI:    1.  No acute infarct, acute intracranial hemorrhage, or intracranial mass.      HEAD MRA:    1.  No aneurysm, high flow AVM or significant stenosis identified.   2.  Variant [place] of [place] anatomy as above.      NECK MRA:   1.  No significant stenosis of the bilateral internal carotid arteries based on NASCET criteria.      MR Brain w/o & w Contrast   Final Result   IMPRESSION:   HEAD MRI:    1.  No acute infarct, acute intracranial hemorrhage, or intracranial mass.      HEAD MRA:    1.  No aneurysm, high flow AVM or significant stenosis identified.   2.  Variant [place] of [place] anatomy as above.      NECK MRA:   1.  No significant stenosis of the bilateral internal carotid arteries based on NASCET criteria.          EKG:    Performed at: 0856  Sinus rhythm  ST segments per nonischemic  No ectopy  Intervals otherwise normal  No previous ECG for comparison  Otherwise unremarkable  Clinical impression: No acute changes    I have independently reviewed and interpreted the EKG(s) documented above.      I, Cedric Garcia, am serving as a scribe to document services personally performed by Dr. Rodriguez based on my observation and the provider's statements to me. I, Samuel Rodriguez MD, attest that Cedric Garcia is acting in a scribe capacity, has observed my performance of the services and has documented them in accordance with my direction.    Samuel Rodriguez MD  Emergency Medicine  [facility] EMERGENCY ROOM  [address]  [place] [id number] [phone]     Samuel Rodriguez MD  01/12/22 6113

## 2022-01-12 NOTE — ED TRIAGE NOTES
"Recent vacation on a cruise. States just got back home after being on the ship for 5 days on Sunday and have been feeling \"off balanced since\".  Denies nausea. Did have a tele med appt yesterday and was prescribed scolopine patches. States worse today and now has sore throat and \"missing steps\" when walking.  Negative covid test yesterday and has been vaccinated.   "

## 2022-01-12 NOTE — ED NOTES
MRI called to check on status of imaging. Spoke with [place], who said it will be around 1100. Pt updated.

## 2022-01-12 NOTE — DISCHARGE INSTRUCTIONS
Take medication as prescribed for management of your symptoms.  Follow-up in our vestibular rehab clinic.  A referral has been placed.  Expect phone call within 24 hours for scheduling.  If escalation your symptoms or develop additional concern return to the emergency department for repeat assessment.

## 2022-09-25 ENCOUNTER — HEALTH MAINTENANCE LETTER (OUTPATIENT)
Age: 42
End: 2022-09-25

## 2022-10-05 ENCOUNTER — VIRTUAL VISIT (OUTPATIENT)
Dept: FAMILY MEDICINE | Facility: CLINIC | Age: 42
End: 2022-10-05
Payer: COMMERCIAL

## 2022-10-05 ENCOUNTER — NURSE TRIAGE (OUTPATIENT)
Dept: NURSING | Facility: CLINIC | Age: 42
End: 2022-10-05

## 2022-10-05 DIAGNOSIS — U07.1 CLINICAL DIAGNOSIS OF COVID-19: Primary | ICD-10-CM

## 2022-10-05 PROCEDURE — 99214 OFFICE O/P EST MOD 30 MIN: CPT | Mod: CS | Performed by: NURSE PRACTITIONER

## 2022-10-05 ASSESSMENT — ENCOUNTER SYMPTOMS
FATIGUE: 0
NAUSEA: 0
DIZZINESS: 0
COUGH: 1
DIAPHORESIS: 0
WHEEZING: 0
FEVER: 1
HEADACHES: 1
SHORTNESS OF BREATH: 0
MYALGIAS: 1
SINUS PRESSURE: 0
SORE THROAT: 1
EYE ITCHING: 0
LIGHT-HEADEDNESS: 0
EYE DISCHARGE: 0
RHINORRHEA: 0
CHILLS: 1
DIARRHEA: 1
CONSTIPATION: 0

## 2022-10-05 NOTE — TELEPHONE ENCOUNTER
Positive home covid test.  Symptoms started last night  Diarrhea  Stuffy nose  Fever 100.9 highest she's seen.  Cough  Pulse Ox >95%.   HR 99  Hypertension. Asking if that qualifies her for Paxlovid.          Reason for Disposition    HIGH RISK for severe COVID complications (e.g., weak immune system, age > 64 years, obesity with BMI > 25, pregnant, chronic lung disease or other chronic medical condition) (Exception: Already seen by PCP and no new or worsening symptoms.)    Additional Information    Negative: SEVERE difficulty breathing (e.g., struggling for each breath, speaks in single words)    Negative: Difficult to awaken or acting confused (e.g., disoriented, slurred speech)    Negative: Bluish (or gray) lips or face now    Negative: Shock suspected (e.g., cold/pale/clammy skin, too weak to stand, low BP, rapid pulse)    Negative: Sounds like a life-threatening emergency to the triager    Negative: SEVERE or constant chest pain or pressure  (Exception: Mild central chest pain, present only when coughing.)    Negative: MODERATE difficulty breathing (e.g., speaks in phrases, SOB even at rest, pulse 100-120)    Negative: Headache and stiff neck (can't touch chin to chest)    Negative: Oxygen level (e.g., pulse oximetry) 90 percent or lower    Negative: Chest pain or pressure    Negative: Patient sounds very sick or weak to the triager    Protocols used: CORONAVIRUS (COVID-19) DIAGNOSED OR [id number]-BF 1.18.2022  Coronavirus (COVID-19) Notification      Gather patient reported symptoms   Assessment   Current Symptoms at time of phone call, reported by patient: (if no symptoms, document No symptoms] Cough, fever, stuffy nose  bodyaches   Date of Symptom(s) onset (if applicable) 10/4/22     If at time of call, Patients symptoms hare worsened, the Patient should contact 911 or have someone drive them to Emergency Dept promptly:      If Patient calling 911, inform 911 personal that you have tested positive for the  Coronavirus (COVID-19).  Place mask on and await 911 to arrive.    If Emergency Dept, If possible, please have another adult drive you to the Emergency Dept but you need to wear mask when in contact with other people.      Monoclonal Antibody Administration    You may be eligible to receive a new treatment with a monoclonal antibody for preventing hospitalization in patients at high risk for complications from COVID-19. This medication is still experimental and available on a limited basis; it is given through an IV and must be given at an infusion center. Please note that not all people who are eligible will receive the medication since it is in limited supply.  Is the patient symptomatic and onset of symptoms within the last 7 days?  Yes  Is the patient interested in a visit with a provider to discuss treatment options?: Yes  Is the patient seen at [facility]?  No: Warm transfer caller to [phone] to be scheduled with a virtual urgent provider.  During transfer, instruct  on appropriate time frame for visit     Review information with Patient    Your result was positive. This means you have COVID-19 (coronavirus).      How can I protect others?    These guidelines are for isolating before returning to work, school or .       If you DO have symptoms:  o Stay home and away from others  - For at least 5 days after your symptoms started, AND   - You are fever free for 24 hours (with no medicine that reduces fever), AND  - Your other symptoms are better.  o Wear a mask for 10 full days any time you are around others.    If you DON'T have symptoms:  o Stay at home and away from others for at least 5 days after your positive test.  o Wear a mask for 10 full days any time you are around others.    There may be different guidelines for healthcare facilities. Please check with the specific sites before arriving.     If you've been told by a doctor that you were severely ill with COVID-19 or  are immunocompromised, you should isolate for at least 10 days.    You should not go back to work until you meet the guidelines above for ending your home isolation. You don't need to be retested for COVID-19 before going back to work--studies show that you won't spread the virus if it's been at least 10 days since your symptoms started (or 20 days, if you have a weak immune system).    Employers, schools, and daycares: This is an official notice for this person's medical guidelines for returning in-person. They must meet the above guidelines before going back to work, school, or  in person.    You will receive a positive COVID-19 letter via [employer] or the mail soon with additional self-care information.      Would you like me to review some of that information with you now?  Yes    How can I take care of myself?      Get lots of rest. Drink extra fluids (unless a doctor has told you not to).      Take Tylenol (acetaminophen) for fever or pain. If you have liver or kidney problems, ask your family doctor if it's okay to take Tylenol.     Take either:     650 mg (two 325 mg pills) every 4 to 6 hours, or     1,000 mg (two 500 mg pills) every 8 hours as needed.     Note: Do not take more than 3,000 mg in one day. Acetaminophen is found in many medicines (both prescribed and over-the-counter medicines). Read all labels to be sure you don't take too much.    For children, check the Tylenol bottle for the right dose (based on their age or weight).      If you have other health problems (like cancer, heart failure, an organ transplant or severe kidney disease): Call your specialty clinic if you don't feel better in the next 2 days.      Know when to call 911: Emergency warning signs include:    Trouble breathing or shortness of breath    Pain or pressure in the chest that doesn't go away    Feeling confused like you haven't felt before, or not being able to wake up  Bluish-colored lips or face      Nithya Peter  RN

## 2022-10-05 NOTE — PATIENT INSTRUCTIONS
Jaden Rangel,     Based on your health history you do qualify for treatment of COVID-19.  For treatment you have been prescribed Paxlovid.  Paxlovid is a combined antiviral medication that reduces risk of hospitalization or severe COVID by 90%.  It is important that you  this medication and start it right away today.  The medication was sent to the [facility] pharmacy.  Please see below for pharmacy information.    While taking Paxlovid, you can continue to take all of your other medications.    You may continue to use Tylenol/ibuprofen fevers, headache, body ache.  You can also use over-the-counter decongestants and cough suppressants to help manage symptoms.    If you develop any severe symptoms of medication reaction or COVID-19 including chest pain, coughing up blood, shortness of breath, swelling, rashes, or any other severe symptoms, please call 911/go to the emergency department.    Please reach out with any questions or concerns.  Gricelda Escalante       COVID-19 Outpatient Treatments  Your care team can help you find the best treatments for COVID-19. Talk to a health care provider or refer to the FDA medicine fact sheets below.  Important: You CAN'T have molnupiravir or Paxlovid if you are starting the medicine more than 5 days after your symptoms have started.  Paxlovid: https://www.fda.gov/media/216109/download  Molnupiravir: https://www.fda.gov/media/237388/download  Monoclonal antibodies: https://combatcovid.hhs.gov/what-are-monoclonal-antibodies  Paxlovid (nimatrelvir and ritonavir)  How it works  Two medicines (nirmatrelvir and ritonavir) are taken together. They stop the virus from growing. Less amount of virus is easier for your body to fight.  Benefits  Lowers risk of a hospital stay or death from COVID-19.  How to take  Medicine comes in a daily container with both medicine tablets. Take by mouth twice daily (once in the morning, once at night) for 5 days.  The number of tablets to  take varies by patient.  Don't chew or break capsules. Swallow whole.  When to take  Take as soon as possible after positive COVID-19 test result, and within 5 days of your first symptoms.  Who can take it  Patients must be 12 years or older, weigh at least 88 pounds, and have tested positive for COVID-19. This is the preferred treatment for pregnant patients.  Possible side effects  Can cause altered sense of taste, diarrhea (loose, watery stools), high blood pressure, muscle aches.  Medicine conflicts  Some medicines may conflict with Paxlovid and may cause serious side effects.  Tell your care team about all the medicines you take, including prescription and over-the-counter medicines, vitamins and herbal supplements.  Your provider will review your medicines to make sure that you can safely take Paxlovid.  Cautions  Paxlovid is not advised for patients with severe kidney or liver disease. If you have kidney or liver problems, the dose may need to be changed.  If you are pregnant or breastfeeding, talk to your care team about your options.  If you are sexually active, use trusted birth control while taking Paxlovid.

## 2022-10-05 NOTE — PROGRESS NOTES
"Claire is a 41 year old who is being evaluated via a billable telephone visit.      What phone number would you like to be contacted at? [phone]  How would you like to obtain your AVS? Johnna    Assessment & Plan     Clinical diagnosis of COVID-19  Patient is a 41who presents to clinic due to symptoms of COVID-19 starting on 10/4/22 with subsequent positive test results.  Patient is able to speak in full sentences-no signs of respiratory distress. Per CDC criteria, patient qualifies for prescribed treatment of COVID19. Discussed treatment options for COVID-19 as well as risks and benefits.  Patient elects to proceed with paxlovid.  Discussed home medications and possible interactions.  Also discussed OTC options for managing symptoms of COVID-19.  Return and urgent/emergent follow-up precautions provided.  - nirmatrelvir and ritonavir (PAXLOVID) therapy pack; Take 3 tablets by mouth 2 times daily for 5 days (Take 2 Nirmatrelvir tablets and 1 Ritonavir tablet twice daily for 5 days)    Prescription drug management  30 minutes spent on the date of the encounter doing chart review, history and exam, documentation and further activities per the note       BMI:   Estimated body mass index is 31.93 kg/m  as calculated from the following:    Height as of 1/12/22: 1.727 m (5' 8\").    Weight as of 1/12/22: 95.3 kg (210 lb).     COVID-19 positive patient.  Encounter for consideration of medication intervention. Patient does qualify for a prescription. Full discussion with patient including medication options, risks and benefits. Potential drug interactions reviewed with patient.     Treatment Planned [facility] to [facility]     Temporary change to home medications:  None     Estimated body mass index is 31.93 kg/m  as calculated from the following:    Height as of 1/12/22: 1.727 m (5' 8\").    Weight as of 1/12/22: 95.3 kg (210 lb).  GFR Estimate   Date Value Ref Range Status   01/12/2022 >90 >60 " mL/min/1.73m2 Final     Comment:     Effective December 21, 2021 eGFRcr in adults is calculated using the 2021 CKD-EPI creatinine equation which includes age and gender (Michelle et al., NEJ, DOI: 10.1056/YFDCkc3704945)   03/13/2019 >60 >60 mL/min/1.73m2 Final     No results found for: IHXTF03EEC    No follow-ups on file.    GARRY Gillespie CNP  M [facility] JOANA Rangel is a 41 year old, presenting for the following health issues:  Consult      HPI       COVID-19 Symptom Review  How many days ago did these symptoms start? Last night, positive this morning    Are any of the following symptoms significant for you?    New or worsening difficulty breathing? No    Worsening cough? Yes, it's a dry cough.     Fever or chills? Yes, I felt feverish or had chills.    Headache: YES    Sore throat: YES    Chest pain: No    Diarrhea: YES    Body aches? YES    What treatments has patient tried? Ibuprofen    Does patient live in a nursing home, group home, or shelter? No  Does patient have a way to get food/medications during quarantined? Yes, I have a friend or family member who can help me.    Phone call completed due COVID 19 outbreak.     Last night started to feel ill. Irritated throat. As the night went on ears started to hurt. Went to bed. Woke up drenched in sweat. Dry cough and stuffy nose. Took COVID test this AM and it was positive. Has not had COVID in the past. Feels very hot. Temp up to 100.9 at home. Then gets chills. Taking ibuprofen. Has 3 children and . No one else in home is ill. 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 Cough is non-productive.     Review of Systems   Constitutional: Positive for chills and fever. Negative for diaphoresis and fatigue.   HENT: Positive for sore throat. Negative for ear discharge, ear pain, hearing loss, rhinorrhea and sinus pressure.    Eyes: Negative for discharge and itching.   Respiratory: Positive for cough.  Negative for shortness of breath and wheezing.    Gastrointestinal: Positive for diarrhea. Negative for constipation and nausea.   Musculoskeletal: Positive for myalgias.   Skin: Negative for rash.   Neurological: Positive for headaches. Negative for dizziness and light-headedness.            Objective           Vitals:  No vitals were obtained today due to virtual visit.    Physical Exam   healthy, alert and no distress  PSYCH: Alert and oriented times 3; coherent speech, normal   rate and volume, able to articulate logical thoughts, able   to abstract reason, no tangential thoughts, no hallucinations   or delusions  Her affect is normal and pleasant  RESP: No cough, no audible wheezing, able to talk in full sentences  Remainder of exam unable to be completed due to telephone visits          Phone call duration: 13 minutes

## 2022-10-10 ENCOUNTER — HOSPITAL ENCOUNTER (EMERGENCY)
Facility: CLINIC | Age: 42
Discharge: HOME | End: 2022-10-11
Attending: EMERGENCY MEDICINE | Admitting: EMERGENCY MEDICINE
Payer: COMMERCIAL

## 2022-10-10 ENCOUNTER — ANCILLARY PROCEDURE (OUTPATIENT)
Dept: ULTRASOUND IMAGING | Facility: CLINIC | Age: 42
End: 2022-10-10
Attending: EMERGENCY MEDICINE
Payer: COMMERCIAL

## 2022-10-10 DIAGNOSIS — L02.411 ABSCESS OF AXILLA, RIGHT: ICD-10-CM

## 2022-10-10 PROCEDURE — 10060 I&D ABSCESS SIMPLE/SINGLE: CPT

## 2022-10-10 PROCEDURE — 99284 EMERGENCY DEPT VISIT MOD MDM: CPT | Mod: 25

## 2022-10-11 VITALS
DIASTOLIC BLOOD PRESSURE: 110 MMHG | RESPIRATION RATE: 18 BRPM | OXYGEN SATURATION: 97 % | SYSTOLIC BLOOD PRESSURE: 149 MMHG | HEART RATE: 94 BPM | BODY MASS INDEX: 31.93 KG/M2 | WEIGHT: 210 LBS | TEMPERATURE: 99 F

## 2022-10-11 RX ORDER — SULFAMETHOXAZOLE/TRIMETHOPRIM 800-160 MG
1 TABLET ORAL 2 TIMES DAILY
Qty: 14 TABLET | Refills: 0 | Status: SHIPPED | OUTPATIENT
Start: 2022-10-11 | End: 2022-10-18

## 2022-10-11 NOTE — DISCHARGE INSTRUCTIONS
You were seen in the emergency department at [facility] for right-sided armpit swelling.  You were found to have a right axillary abscess which was drained.  We did leave a small amount of packing in place this can be removed in 2 days time.  We are going to start you on an antibiotic.  You should continue using Tylenol and ibuprofen.  You can apply warm compresses to help express drainage.  Please try to follow-up in clinic to review how things are going over the next several days.  If you have other immediate concerns like high fever and severe increase in swelling we can reevaluate you as needed in the emergency department.

## 2022-10-11 NOTE — ED TRIAGE NOTES
Pt presents to the ED with c/o swollen lymph node in right armpit. Pt first noticed it yesterday, but it has been increasing in size and pain. Pt reports having covid, 6 days out, and took paxlovid. Denies CP, SOB. Pt states that it is too painful to move right arm. Took ibuprofen without relief. Has tried heat packs.      Triage Assessment     Row Name 10/10/22 2938       Triage Assessment (Adult)    Airway WDL WDL       Respiratory WDL    Respiratory WDL WDL       Skin Circulation/Temperature WDL    Skin Circulation/Temperature WDL WDL       Cardiac WDL    Cardiac WDL WDL       Peripheral/Neurovascular WDL    Peripheral Neurovascular WDL WDL       Cognitive/Neuro/Behavioral WDL    Cognitive/Neuro/Behavioral WDL WDL

## 2022-10-11 NOTE — ED PROVIDER NOTES
POC US SOFT TISSUE    Date/Time: 10/11/2022 12:01 AM  Performed by: Brenton Charlton MD  Authorized by: Brenton Charlton MD     Procedure Details & Findings:      Indications: Soft tissue swelling  Views obtained: Right axilla, multiple planes  Findings: There is a small amount of abnormal cobblestoning of the tissue and an approximately 2 cm heterogeneous fluid collection.  Impression: Findings consistent with a right axillary abscess.    Unfortunately due to a  issue unable to save images at this time.             Brenton Charlton MD  10/11/22 0002

## 2022-10-11 NOTE — ED PROVIDER NOTES
EMERGENCY DEPARTMENT ENCOUNTER      NAME: Claire De La Cruz  AGE: 41 year old female  YOB: 1980  MRN: [id number]  EVALUATION DATE & TIME: 10/10/2022 11:40 PM    PCP: Sharifa Naylor    ED PROVIDER: Brenton Charlton M.D.      Chief Complaint   Patient presents with     Lymphadenopathy         FINAL IMPRESSION:  1. Abscess of axilla, right          ED COURSE & MEDICAL DECISION MAKIN year old female presents to the Emergency Department for evaluation of right axillary pain.  Patient has a tender fluctuant mass about 3 cm across in the right axilla consistent with abscess versus hidradenitis.  Confirmatory ultrasound did show a drainable fluid collection.  I&D was performed with purulent output.  Patient will complete a course of Bactrim given the location and seemingly somewhat loculated and complex fluid collection.  I was able to place a drain which will need to be removed in a few days in clinic.  We discussed wound cares.  Patient was discharged in good condition    At the conclusion of the encounter I discussed the results of all of the tests and the disposition. The questions were answered. The patient or family acknowledged understanding and was agreeable with the care plan.       MEDICATIONS GIVEN IN THE EMERGENCY:  Medications - No data to display    NEW PRESCRIPTIONS STARTED AT TODAY'S ER VISIT  Discharge Medication List as of 10/11/2022 12:22 AM      START taking these medications    Details   sulfamethoxazole-trimethoprim (BACTRIM DS) 800-160 MG tablet Take 1 tablet by mouth 2 times daily for 7 days, Disp-14 tablet, R-0, Local Print                =================================================================    HPI    Patient information was obtained from: patient    Use of : N/A       Claire De La Cruz is a 41 year old female with a pertinent history of HTN, JE II, and s/p cholecystectomy who presents to this ED via walk-in for evaluation of lymphadenopathy.     Patient  reports right armpit pain and swelling that has increasingly worsened, which prompted her to the ED. She has never experienced these symptoms before. She is still recovering from Covid and notes that fever and diaphoresis is subsiding. Currently, patient states that right arm movement exacerbates her symptoms. Denies any drainage. Denies any other symptoms or complaints.       REVIEW OF SYSTEMS   All systems reviewed and negative except as noted in HPI.    PAST MEDICAL HISTORY:  Past Medical History:   Diagnosis Date     Abnormal Pap smear of cervix      JE II (cervical intraepithelial neoplasia II) 11/14/2008    10/14/08 ASCUS with positive HPV 11/14/08 Colpo JE II 12/3/08 LEEP mild squamous atypia, ECC neg 4/22/11 NIL 7/25/15 NIL pap, neg HPV 11/11/21 NIL pap, neg HPV. Plan: cotest in 1 year     Hypertension      Postpartum hemorrhage 2010       PAST SURGICAL HISTORY:  Past Surgical History:   Procedure Laterality Date     BIOPSY CERVICAL, LOCAL EXCISION, SINGLE/MULTIPLE  2009    unknown dx.  normal paps since.     CHOLECYSTECTOMY             CURRENT MEDICATIONS:    No current facility-administered medications for this encounter.     Current Outpatient Medications   Medication     sulfamethoxazole-trimethoprim (BACTRIM DS) 800-160 MG tablet     hydrochlorothiazide (HYDRODIURIL) 25 MG tablet     lisinopril (ZESTRIL) 40 MG tablet         ALLERGIES:  No Known Allergies    FAMILY HISTORY:  Family History   Problem Relation Age of Onset     No Known Problems Mother      Hypertension Father      Diabetes Father      Clotting Disorder Maternal Grandfather      Breast Cancer No family hx of        SOCIAL HISTORY:   Social History     Socioeconomic History     Marital status: Single   Tobacco Use     Smoking status: Never     Smokeless tobacco: Never   Vaping Use     Vaping Use: Never used   Substance and Sexual Activity     Alcohol use: Yes     Comment: Alcoholic Drinks/day: Seldom     Drug use: No     Sexual activity:  Yes     Partners: Male     Birth control/protection: I.U.D.       VITALS:  BP (!) 149/110   Pulse 94   Temp 99  F (37.2  C) (Temporal)   Resp 18   Wt 95.3 kg (210 lb)   SpO2 97%   BMI 31.93 kg/m      PHYSICAL EXAM    Constitutional: Well developed, Well nourished, NAD.  HENT: Normocephalic, Atraumatic. Neck Supple.  Eyes: EOMI, Conjunctiva normal.  Respiratory: Breathing comfortably on room air. Speaks full sentences easily. Lungs clear to ascultation.  Cardiovascular: Normal heart rate, Regular rhythm. No peripheral edema.  Abdomen: Sof  Musculoskeletal: Good range of motion in all major joints. No major deformities noted.  There is a tender fluctuant 2 to 3 cm mass in the right axilla.  No other significant swelling or palpable lymphadenopathy.  Minimal overlying erythema.  Integument: Warm, Dry.  Neurologic: Alert & awake, Normal motor function, Normal sensory function, No focal deficits noted.   Psychiatric: Cooperative. Affect appropriate.       RADIOLOGY:  Reviewed all pertinent imaging. Please see official radiology report.  POC US SOFT TISSUE   Final Result            PROCEDURES:   PROCEDURE: Incision and Drainage   INDICATIONS: Localized abscess   PROCEDURE PROVIDER: Dr Herrera Charlton   SITE: R axilla   MEDICATION: 8 mLs of 2% Lidocaine without epinephrine   NOTE: The area was prepped with chlorhexidine and draped off in the usual sterile fashion.  Local anesthetic was injected subcutaneously with anesthesia effects demonstrated prior to proceeding.  The area of maximal fluctuance was opened with a # 11 Blade ([place]) using a Single Straight incision to allow for drainage.  The abscess was drained.  The abscess cavity was bluntly explored to separate any loculations. Iodoform Gauze was placed into the abscess cavity.  A sterile dressing was placed over the area.   COMPLEXITY: Complex       COMPLICATIONS: Patient tolerated procedure well, without complication           Julianna ANGELES am serving as  a scribe to document services personally performed by Dr. Brenton Charlton, based on my observation and the provider's statements to me. I, Brenton Charlton MD attest that Julianna Leung is acting in a scribe capacity, has observed my performance of the services and has documented them in accordance with my direction.    Brenton Charlton M.D.  Emergency Medicine  [facility] EMERGENCY ROOM  [facility] [facility] [id number]  [phone]  Dept: [phone]       Brenton Charlton MD  10/11/22 9715

## 2022-10-14 ENCOUNTER — TELEPHONE (OUTPATIENT)
Dept: NURSING | Facility: CLINIC | Age: 42
End: 2022-10-14

## 2022-10-14 NOTE — LETTER
October 14, 2022      Claire De La Cruz  [address] [place] [id number]        To Whom It May Concern:    Claire De La Cruz was seen in the ED for Abscess of axilla, right. She is also taking Bactrim. Please excuse her from getting the flu shot as it is contraindicated with her current situation. Please give Claire an extension from getting the flu shot until 10/19/22.      Sincerely,      Piotr Ma Cem Say, BSN RN  [facility]

## 2022-10-14 NOTE — TELEPHONE ENCOUNTER
Called pt in an attempt to ask when they'll be able to get the flu shot and the dates they need to be excused on. Left a VM to call back.    Piotr Ma Cem Say, BSN RN  [facility]

## 2022-10-14 NOTE — TELEPHONE ENCOUNTER
"Is being treated for abscess under her right armpit.  Seen in ER on 10/10/22.   I & D done and Bactrim given.  Now, a new swelling close to original.  Asking what to do.  Is still taking Bactrim.    Per the AVS:   \"If you have other immediate concerns like high fever and severe  increase in swelling we can reevaluate you as needed in the emergency  Department.\"      Because of her infection, Claire can't get her influenza vaccine. 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 She'd like a note from pcp excusing her from getting her flu vaccine right now.    She's okay if this is sent to her through [employer].    Mary RAMIREZ RN [facility]                     "

## 2022-10-14 NOTE — TELEPHONE ENCOUNTER
Pt stated that the ED told her that she should not get the flu-shot due to taking bactrim.     Pt needs a note and would like an extension until 10/19/22.     Note written and send via My Chart.    Piotr Ma Cem Say, BSN RN  [facility]

## 2022-10-20 ENCOUNTER — OFFICE VISIT (OUTPATIENT)
Dept: FAMILY MEDICINE | Facility: CLINIC | Age: 42
End: 2022-10-20
Payer: COMMERCIAL

## 2022-10-20 VITALS
HEART RATE: 94 BPM | RESPIRATION RATE: 16 BRPM | OXYGEN SATURATION: 96 % | BODY MASS INDEX: 34.36 KG/M2 | DIASTOLIC BLOOD PRESSURE: 96 MMHG | SYSTOLIC BLOOD PRESSURE: 139 MMHG | WEIGHT: 226 LBS | TEMPERATURE: 97.6 F

## 2022-10-20 DIAGNOSIS — L02.419 AXILLARY ABSCESS: Primary | ICD-10-CM

## 2022-10-20 DIAGNOSIS — I10 BENIGN ESSENTIAL HYPERTENSION: ICD-10-CM

## 2022-10-20 DIAGNOSIS — Z23 INFLUENZA VACCINE ADMINISTERED: ICD-10-CM

## 2022-10-20 PROCEDURE — 99213 OFFICE O/P EST LOW 20 MIN: CPT | Mod: 25 | Performed by: PHYSICIAN ASSISTANT

## 2022-10-20 PROCEDURE — 90686 IIV4 VACC NO PRSV 0.5 ML IM: CPT | Performed by: PHYSICIAN ASSISTANT

## 2022-10-20 PROCEDURE — 90471 IMMUNIZATION ADMIN: CPT | Performed by: PHYSICIAN ASSISTANT

## 2022-10-20 RX ORDER — CEPHALEXIN 500 MG/1
500 CAPSULE ORAL 4 TIMES DAILY
Qty: 40 CAPSULE | Refills: 0 | Status: SHIPPED | OUTPATIENT
Start: 2022-10-20 | End: 2022-10-30

## 2022-10-20 NOTE — PROGRESS NOTES
Assessment & Plan:      Problem List Items Addressed This Visit    None  Visit Diagnoses     Axillary abscess    -  Primary    Relevant Medications    cephALEXin (KEFLEX) 500 MG capsule    Other Relevant Orders    Adult General Surg Referral    Influenza vaccine administered        Relevant Orders    INFLUENZA VACCINE IM >6 MO VALENT IIV4 (ALFURIA/FLUZONE) (Completed)    Benign essential hypertension            Medical Decision Making  Patient presents for ongoing swelling and pain of the right axillary region, but also requesting influenza vaccine and ongoing chronic hypertension.  Right axillary region continues to have multiple small firm masses consistent with likely persisting abscess of the right axillary region.  Mass does not appear fluctuant.  No signs of significant erythema or increased warmth to touch.  Patient further has no fevers.  Recommend starting a new course of antibiotics at this time while continuing warm compresses.  Placed referral to general surgery for possible further drainage of the abscesses as needed if no symptom improvement.  Ordered influenza vaccine which was administered by clinical assistant staff.  Discussed patient's ongoing hypertension.  She will continue with lisinopril and hydrochlorothiazide as previously instructed.  Recommended follow-up with primary care discussed possible treatment options at that time.  No signs of red flag symptoms consistent with hypertensive crisis at this time.  Allergies and medication interactions reviewed.  Discussed signs of worsening symptoms and when to follow-up with PCP if no symptom improvement.     Subjective:      Claire De La Cruz is a 41 year old female here for evaluation of hypertension, ongoing swelling and tenderness in the right axillary region, and request for influenza vaccine.  Patient was seen in the emergency room 10 days ago after developing abscess in the right axillary region.  This abscess was drained and patient was  started on oral Bactrim.  Last day of medications was 3 days ago.  Even while taking the Bactrim, patient was developing gradually worsening swelling and tenderness in the right axillary region again.  She states symptoms are not as severe as when she was seen in the emergency room.  No fevers.  Patient has history of hypertension and has noted ongoing elevated blood pressures in the 140s systolically.  Patient is already taking lisinopril 40 mg and hydrochlorothiazide.  She is wondering if she needs to be started on a new medication versus increasing dosage.  She otherwise denies red flag symptoms of severe headache, nausea, vomiting, and vision changes.  Patient is also requesting influenza vaccine.     The following portions of the patient's history were reviewed and updated as appropriate: allergies, current medications, and problem list.     Review of Systems  Pertinent items are noted in HPI.    Allergies  No Known Allergies    Family History   Problem Relation Age of Onset     No Known Problems Mother      Hypertension Father      Diabetes Father      Clotting Disorder Maternal Grandfather      Breast Cancer No family hx of        Social History     Tobacco Use     Smoking status: Never     Smokeless tobacco: Never   Substance Use Topics     Alcohol use: Yes     Comment: Alcoholic Drinks/day: Seldom        Objective:      BP (!) 139/96   Pulse 94   Temp 97.6  F (36.4  C) (Oral)   Resp 16   Wt 102.5 kg (226 lb)   LMP 09/29/2022   SpO2 96%   BMI 34.36 kg/m    General appearance - alert, well appearing, and in no distress and non-toxic  Skin - Right axillary region, multiple firm nodules affecting the right axillary region, previous incision site appears to be healing appropriately, no erythema, no increased warmth to touch, no active drainage    The use of Dragon/[employer] dictation services was used to construct the content of this note; any grammatical errors are non-intentional. Please contact the  author directly if you are in need of any clarification.

## 2022-12-21 ENCOUNTER — E-VISIT (OUTPATIENT)
Dept: URGENT CARE | Facility: CLINIC | Age: 42
End: 2022-12-21
Payer: COMMERCIAL

## 2022-12-21 DIAGNOSIS — J06.9 VIRAL URI WITH COUGH: Primary | ICD-10-CM

## 2022-12-21 PROCEDURE — 99421 OL DIG E/M SVC 5-10 MIN: CPT | Performed by: NURSE PRACTITIONER

## 2022-12-21 RX ORDER — BENZONATATE 100 MG/1
100 CAPSULE ORAL 3 TIMES DAILY PRN
Qty: 30 CAPSULE | Refills: 0 | Status: SHIPPED | OUTPATIENT
Start: 2022-12-21 | End: 2023-01-04

## 2022-12-21 NOTE — PATIENT INSTRUCTIONS
"  Dear Claire De La Cruz    After reviewing your responses, I've been able to diagnose you with \"Bronchitis\" which is a common infection of your lungs that is nearly always caused by a virus. The virus causes swelling and irritation of the air passages of your lungs which leads to cough. The illness spreads from your nose and throat to your windpipe and airways. It is often called a \"chest cold\" and can last up to 2 weeks, but is not a serious illness. Exposure to cigarette smoke usually makes this significantly worse.      To treat bronchitis, the main thing to do is drink lots of fluids and rest. Cough medications over-the-counter such as mucinex, robitussin or \"cold and sinus\" medications can be helpful. Ibuprofen and Tylenol also help with fevers or aching feelings that you often have with this kind of illness. Do not take ibuprofen if you have kidney disease, stomach ulcers or allergy to aspirin.     Bronchitis is most often highly contagious as viruses are spread through the air or touch. Avoid contact with others who may become infected, particularly children, the elderly and those whose immune systems might be weak.     If your symptoms worsen, you develop chest pain or shortness of breath, fevers over 101, or are not improving in 5 days, please contact your primary care provider for an appointment or visit any of our convenient Walk-in Care or Urgent Care Centers to be seen which can be found on our website here.    Thanks again for choosing us as your health care partner,    GARRY Celestin CNP  "

## 2022-12-30 ENCOUNTER — TELEPHONE (OUTPATIENT)
Dept: LAB | Facility: CLINIC | Age: 42
End: 2022-12-30

## 2023-01-02 ENCOUNTER — LAB (OUTPATIENT)
Dept: LAB | Facility: CLINIC | Age: 43
End: 2023-01-02
Payer: COMMERCIAL

## 2023-01-02 DIAGNOSIS — Z00.00 LABORATORY EXAMINATION ORDERED AS PART OF A ROUTINE GENERAL MEDICAL EXAMINATION: ICD-10-CM

## 2023-01-02 DIAGNOSIS — Z00.00 LABORATORY EXAMINATION ORDERED AS PART OF A ROUTINE GENERAL MEDICAL EXAMINATION: Primary | ICD-10-CM

## 2023-01-02 LAB
ANION GAP SERPL CALCULATED.3IONS-SCNC: 12 MMOL/L (ref 7–15)
BUN SERPL-MCNC: 12.1 MG/DL (ref 6–20)
CALCIUM SERPL-MCNC: 9.7 MG/DL (ref 8.6–10)
CHLORIDE SERPL-SCNC: 103 MMOL/L (ref 98–107)
CHOLEST SERPL-MCNC: 164 MG/DL
CREAT SERPL-MCNC: 0.78 MG/DL (ref 0.51–0.95)
DEPRECATED HCO3 PLAS-SCNC: 25 MMOL/L (ref 22–29)
GFR SERPL CREATININE-BSD FRML MDRD: >90 ML/MIN/1.73M2
GLUCOSE SERPL-MCNC: 162 MG/DL (ref 70–99)
HDLC SERPL-MCNC: 42 MG/DL
LDLC SERPL CALC-MCNC: 84 MG/DL
NONHDLC SERPL-MCNC: 122 MG/DL
POTASSIUM SERPL-SCNC: 4.4 MMOL/L (ref 3.4–5.3)
SODIUM SERPL-SCNC: 140 MMOL/L (ref 136–145)
TRIGL SERPL-MCNC: 192 MG/DL

## 2023-01-02 PROCEDURE — 36415 COLL VENOUS BLD VENIPUNCTURE: CPT

## 2023-01-02 PROCEDURE — 80061 LIPID PANEL: CPT

## 2023-01-02 PROCEDURE — 80048 BASIC METABOLIC PNL TOTAL CA: CPT

## 2023-01-04 ENCOUNTER — OFFICE VISIT (OUTPATIENT)
Dept: FAMILY MEDICINE | Facility: CLINIC | Age: 43
End: 2023-01-04
Payer: COMMERCIAL

## 2023-01-04 DIAGNOSIS — Z12.31 ENCOUNTER FOR SCREENING MAMMOGRAM FOR BREAST CANCER: ICD-10-CM

## 2023-01-04 DIAGNOSIS — Z12.4 CERVICAL CANCER SCREENING: ICD-10-CM

## 2023-01-04 DIAGNOSIS — R73.01 ELEVATED FASTING GLUCOSE: ICD-10-CM

## 2023-01-04 DIAGNOSIS — I10 ESSENTIAL HYPERTENSION: ICD-10-CM

## 2023-01-04 DIAGNOSIS — Z00.00 ROUTINE GENERAL MEDICAL EXAMINATION AT A HEALTH CARE FACILITY: Primary | ICD-10-CM

## 2023-01-04 DIAGNOSIS — B35.1 ONYCHOMYCOSIS: ICD-10-CM

## 2023-01-04 DIAGNOSIS — E72.10 DISORDER OF SULFUR-BEARING AMINO ACID METABOLISM (H): ICD-10-CM

## 2023-01-04 DIAGNOSIS — L60.3 DYSTROPHIC NAIL: ICD-10-CM

## 2023-01-04 DIAGNOSIS — E11.9 TYPE 2 DIABETES MELLITUS WITHOUT COMPLICATION, WITHOUT LONG-TERM CURRENT USE OF INSULIN (H): ICD-10-CM

## 2023-01-04 DIAGNOSIS — Z87.42 HISTORY OF ABNORMAL CERVICAL PAP SMEAR: ICD-10-CM

## 2023-01-04 LAB — HBA1C MFR BLD: 6.9 % (ref 0–5.6)

## 2023-01-04 PROCEDURE — 87624 HPV HI-RISK TYP POOLED RSLT: CPT | Performed by: FAMILY MEDICINE

## 2023-01-04 PROCEDURE — 99213 OFFICE O/P EST LOW 20 MIN: CPT | Mod: 25 | Performed by: FAMILY MEDICINE

## 2023-01-04 PROCEDURE — G0145 SCR C/V CYTO,THINLAYER,RESCR: HCPCS | Performed by: FAMILY MEDICINE

## 2023-01-04 PROCEDURE — 99396 PREV VISIT EST AGE 40-64: CPT | Performed by: FAMILY MEDICINE

## 2023-01-04 PROCEDURE — 83036 HEMOGLOBIN GLYCOSYLATED A1C: CPT | Performed by: FAMILY MEDICINE

## 2023-01-04 PROCEDURE — 36415 COLL VENOUS BLD VENIPUNCTURE: CPT | Performed by: FAMILY MEDICINE

## 2023-01-04 RX ORDER — AMLODIPINE BESYLATE 5 MG/1
5 TABLET ORAL DAILY
Qty: 90 TABLET | Refills: 3 | Status: SHIPPED | OUTPATIENT
Start: 2023-01-04 | End: 2023-08-12

## 2023-01-04 ASSESSMENT — ENCOUNTER SYMPTOMS
PALPITATIONS: 0
NERVOUS/ANXIOUS: 0
SORE THROAT: 0
SHORTNESS OF BREATH: 0
ABDOMINAL PAIN: 0
DYSURIA: 0
CONSTIPATION: 0
DIZZINESS: 0
HEADACHES: 0
DIARRHEA: 1
MYALGIAS: 0
HEARTBURN: 0
CHILLS: 0
NAUSEA: 0
EYE PAIN: 0
COUGH: 0
FREQUENCY: 0
ARTHRALGIAS: 0
HEMATURIA: 0
HEMATOCHEZIA: 0
PARESTHESIAS: 0
WEAKNESS: 0
FEVER: 0
JOINT SWELLING: 0

## 2023-01-04 NOTE — PROGRESS NOTES
SUBJECTIVE:   CC: Claire is an 42 year old who presents for preventive health visit.   Patient has been advised of split billing requirements and indicates understanding: Yes  Healthy Habits:     Getting at least 3 servings of Calcium per day:  NO    Bi-annual eye exam:  Yes    Dental care twice a year:  Yes    Sleep apnea or symptoms of sleep apnea:  None    Diet:  Regular (no restrictions)    Frequency of exercise:  1 day/week    Duration of exercise:  Less than 15 minutes    Taking medications regularly:  Yes    Medication side effects:  None    PHQ-2 Total Score: 0    Additional concerns today:  Yes    She has toenail that is discolored and she thinks it should be removed. Interested in referral to podiatry    HTN: she takes hydrochlorothiazide and lisinopril. Her BP has been elevated at home, consistently 140's/90's. She had significantly elevated BP at recent medical appointment.     History of pre-diabetes. Due for recheck.      Today's PHQ-2 Score:   PHQ-2 ( 1999 Pfizer) 1/4/2023   Q1: Little interest or pleasure in doing things 0   Q2: Feeling down, depressed or hopeless 0   PHQ-2 Score 0   Q1: Little interest or pleasure in doing things Not at all   Q2: Feeling down, depressed or hopeless Not at all   PHQ-2 Score 0       Social History     Tobacco Use     Smoking status: Never     Smokeless tobacco: Never   Substance Use Topics     Alcohol use: Yes     Comment: Alcoholic Drinks/day: Seldom         Alcohol Use 1/4/2023   Prescreen: >3 drinks/day or >7 drinks/week? No   Prescreen: >3 drinks/day or >7 drinks/week? -       Breast Cancer Screening:    Breast CA Risk Assessment (FHS-7) 11/11/2021   Do you have a family history of breast, colon, or ovarian cancer? No / Unknown       Pertinent mammograms are reviewed under the imaging tab.      PAP / HPV Latest Ref Rng & Units 11/11/2021 7/24/2015   PAP   Negative for Intraepithelial Lesion or Malignancy (NILM) Negative for squamous intraepithelial lesion or  "malignancy  Electronically signed by Chyna Brewer CT (ASCP) on 7/30/2015 at  2:22 PM     HPV16 Negative Negative -   HPV18 Negative Negative -   HRHPV Negative Negative -     Reviewed and updated as needed this visit by clinical staff   Tobacco  Allergies  Meds              Reviewed and updated as needed this visit by Provider   Tobacco  Allergies  Meds  Problems  Med Hx  Surg Hx  Fam Hx             Review of Systems   Constitutional: Negative for chills and fever.   HENT: Negative for congestion, ear pain, hearing loss and sore throat.    Eyes: Negative for pain and visual disturbance.   Respiratory: Negative for cough and shortness of breath.    Cardiovascular: Negative for chest pain, palpitations and peripheral edema.   Gastrointestinal: Positive for diarrhea. Negative for abdominal pain, constipation, heartburn, hematochezia and nausea.   Genitourinary: Negative for dysuria, frequency, genital sores, hematuria and urgency.   Musculoskeletal: Negative for arthralgias, joint swelling and myalgias.   Skin: Negative for rash.   Neurological: Negative for dizziness, weakness, headaches and paresthesias.   Psychiatric/Behavioral: Negative for mood changes. The patient is not nervous/anxious.           OBJECTIVE:   /88   Pulse 84   Temp 97.8  F (36.6  C) (Oral)   Ht 1.734 m (5' 8.25\")   Wt 102.5 kg (226 lb)   LMP 12/28/2022 (Exact Date)   SpO2 97%   BMI 34.11 kg/m    Physical Exam  General: Alert and oriented, in NAD.  Eyes: PERRL, EOMI, sclera normal.  HENT: Normocephalic, no pharyngeal erythema, MMM.  Neck: Supple, no adenopathy.  Heart: Normal S1 and S2, regular rhythm. No murmurs, gallops, rubs.  Lungs: CTA bilaterally, no wheezes, no crackles, no rhonchi.  Extremities: Dystophic, thickened toenail.   (female): External genitalia is without lesions. Introitus is normal, vaginal walls pink and moist without lesions or evidence of trauma. No cervical lesions or discharge  Psych: Normal " "mood and affect.      ASSESSMENT/PLAN:     Claire was seen today for physical.    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     Hypertension: BP elevated on initial check. Ambulatory BP readings elevated. Reviewed plan to add antihypertensive. Encouraged healthy lifestyle changes. Patient will check ambulatory BP readings for 1-2 weeks and send results via [employer].  -     amLODIPine (NORVASC) 5 MG tablet; Take 1 tablet (5 mg) by mouth daily    History of abnormal cervical Pap smear  Cervical cancer screening: Pap smear with cotesting.  -     Pap Screen with HPV - recommended age 30 - 65 years  -     HPV Hold (Lab Only)    Elevated fasting glucose: Check A1C.  -     Hemoglobin A1c; Future  -     Hemoglobin A1c    Encounter for screening mammogram for breast cancer  -     *MA Screening Digital Bilateral; Future    Dystrophic nail: Refer to podiatry   -     Orthopedic  Referral; Future    Encouraged to try fiber supplement for 1-2 months. If persistent symptoms, she will notify me via [employer].    COUNSELING:  Reviewed preventive health counseling, as reflected in patient instructions      BMI:   Estimated body mass index is 34.11 kg/m  as calculated from the following:    Height as of this encounter: 1.734 m (5' 8.25\").    Weight as of this encounter: 102.5 kg (226 lb).   Weight management plan: Discussed healthy diet and exercise guidelines      She reports that she has never smoked. She has never used smokeless tobacco.          Sharifa Naylor MD  [facility]  "

## 2023-01-04 NOTE — PATIENT INSTRUCTIONS
Check ambulatory blood pressure readings daily for 1-2 weeks and send results via [employer]  Add amlodipine 5 mg daily    Try over the counter fiber supplement for 1-2 months. If you have any blood in your stool, fever, or severe abdominal pain, let me know.       Preventive Health Recommendations  Female Ages 40 to 49    Yearly exam:   See your health care provider every year in order to  Review health changes.   Discuss preventive care.    Review your medicines if your doctor prescribed any.    Get a Pap test every three years (unless you have an abnormal result and your provider advises testing more often).    If you get Pap tests with HPV test, you only need to test every 5 years, unless you have an abnormal result. You do not need a Pap test if your uterus was removed (hysterectomy) and you have not had cancer.    You should be tested each year for STDs (sexually transmitted diseases), if you're at risk.   Ask your doctor if you should have a mammogram.    Have a colonoscopy (test for colon cancer) if someone in your family has had colon cancer or polyps before age 50.     Have a cholesterol test every 5 years.     Have a diabetes test (fasting glucose) after age 45. If you are at risk for diabetes, you should have this test every 3 years.    Shots: Get a flu shot each year. Get a tetanus shot every 10 years.     Nutrition:   Eat at least 5 servings of fruits and vegetables each day.  Eat whole-grain bread, whole-wheat pasta and brown rice instead of white grains and rice.  Get adequate Calcium and Vitamin D.      Lifestyle  Exercise at least 150 minutes a week (an average of 30 minutes a day, 5 days a week). This will help you control your weight and prevent disease.  Limit alcohol to one drink per day.  No smoking.   Wear sunscreen to prevent skin cancer.  See your dentist every six months for an exam and cleaning.

## 2023-01-05 VITALS
SYSTOLIC BLOOD PRESSURE: 128 MMHG | HEART RATE: 84 BPM | BODY MASS INDEX: 34.25 KG/M2 | HEIGHT: 68 IN | OXYGEN SATURATION: 97 % | TEMPERATURE: 97.8 F | DIASTOLIC BLOOD PRESSURE: 88 MMHG | WEIGHT: 226 LBS

## 2023-01-06 LAB
BKR LAB AP GYN ADEQUACY: NORMAL
BKR LAB AP GYN INTERPRETATION: NORMAL
BKR LAB AP HPV REFLEX: NORMAL
BKR LAB AP LMP: NORMAL
BKR LAB AP PREVIOUS ABNL DX: NORMAL
BKR LAB AP PREVIOUS ABNORMAL: NORMAL
PATH REPORT.COMMENTS IMP SPEC: NORMAL
PATH REPORT.COMMENTS IMP SPEC: NORMAL
PATH REPORT.RELEVANT HX SPEC: NORMAL

## 2023-01-08 PROBLEM — E11.9 DIABETES MELLITUS, TYPE 2 (H): Status: ACTIVE | Noted: 2023-01-08

## 2023-01-10 ENCOUNTER — TELEPHONE (OUTPATIENT)
Dept: FAMILY MEDICINE | Facility: CLINIC | Age: 43
End: 2023-01-10

## 2023-01-10 ENCOUNTER — ANCILLARY PROCEDURE (OUTPATIENT)
Dept: MAMMOGRAPHY | Facility: CLINIC | Age: 43
End: 2023-01-10
Payer: COMMERCIAL

## 2023-01-10 DIAGNOSIS — E11.9 TYPE 2 DIABETES MELLITUS WITHOUT COMPLICATION, WITHOUT LONG-TERM CURRENT USE OF INSULIN (H): Primary | ICD-10-CM

## 2023-01-10 DIAGNOSIS — Z12.31 ENCOUNTER FOR SCREENING MAMMOGRAM FOR BREAST CANCER: ICD-10-CM

## 2023-01-10 PROCEDURE — 77067 SCR MAMMO BI INCL CAD: CPT | Mod: TC | Performed by: RADIOLOGY

## 2023-01-10 RX ORDER — GLUCOSAMINE HCL/CHONDROITIN SU 500-400 MG
CAPSULE ORAL
Qty: 100 EACH | Refills: 3 | Status: SHIPPED | OUTPATIENT
Start: 2023-01-10 | End: 2023-08-12

## 2023-01-10 RX ORDER — LANCETS
EACH MISCELLANEOUS
Qty: 100 EACH | Refills: 6 | Status: SHIPPED | OUTPATIENT
Start: 2023-01-10 | End: 2023-08-12

## 2023-01-10 NOTE — TELEPHONE ENCOUNTER
Author reviewed chart. Could not find any outgoing messages. If pt calls back, advise pt to wait for additional calls from the office. No outgoing calls from [facility].      Piotr Shields, BSN RN  [facility]

## 2023-01-10 NOTE — TELEPHONE ENCOUNTER
Patient called returning call from a Dr. Rubalcava in regards to lab work. Patient was confused as that is not her PCP and she has not seen anyone matching that name. Did not see documentation of call so reached out to on site staff at [facility]. They were unsure as well so routing TE to staff to dig deeper-

## 2023-01-11 ENCOUNTER — ANCILLARY PROCEDURE (OUTPATIENT)
Dept: GENERAL RADIOLOGY | Facility: CLINIC | Age: 43
End: 2023-01-11
Attending: FAMILY MEDICINE
Payer: COMMERCIAL

## 2023-01-11 ENCOUNTER — OFFICE VISIT (OUTPATIENT)
Dept: FAMILY MEDICINE | Facility: CLINIC | Age: 43
End: 2023-01-11
Payer: COMMERCIAL

## 2023-01-11 VITALS
SYSTOLIC BLOOD PRESSURE: 160 MMHG | HEART RATE: 95 BPM | DIASTOLIC BLOOD PRESSURE: 96 MMHG | OXYGEN SATURATION: 95 % | RESPIRATION RATE: 18 BRPM | TEMPERATURE: 98.2 F

## 2023-01-11 DIAGNOSIS — R05.3 PERSISTENT COUGH: ICD-10-CM

## 2023-01-11 DIAGNOSIS — K21.9 GASTROESOPHAGEAL REFLUX DISEASE, UNSPECIFIED WHETHER ESOPHAGITIS PRESENT: ICD-10-CM

## 2023-01-11 DIAGNOSIS — R05.3 PERSISTENT COUGH: Primary | ICD-10-CM

## 2023-01-11 PROCEDURE — 94640 AIRWAY INHALATION TREATMENT: CPT | Performed by: FAMILY MEDICINE

## 2023-01-11 PROCEDURE — 71046 X-RAY EXAM CHEST 2 VIEWS: CPT | Mod: TC | Performed by: RADIOLOGY

## 2023-01-11 PROCEDURE — 99214 OFFICE O/P EST MOD 30 MIN: CPT | Mod: 25 | Performed by: FAMILY MEDICINE

## 2023-01-11 RX ORDER — BENZONATATE 100 MG/1
100 CAPSULE ORAL 3 TIMES DAILY PRN
Qty: 30 CAPSULE | Refills: 0 | Status: SHIPPED | OUTPATIENT
Start: 2023-01-11 | End: 2023-08-12

## 2023-01-11 RX ORDER — ALBUTEROL SULFATE 90 UG/1
2 AEROSOL, METERED RESPIRATORY (INHALATION) EVERY 4 HOURS PRN
Qty: 18 G | Refills: 0 | Status: SHIPPED | OUTPATIENT
Start: 2023-01-11 | End: 2023-01-25

## 2023-01-11 RX ORDER — AZITHROMYCIN 250 MG/1
TABLET, FILM COATED ORAL
Qty: 6 TABLET | Refills: 0 | Status: SHIPPED | OUTPATIENT
Start: 2023-01-11 | End: 2023-01-16

## 2023-01-11 RX ORDER — PREDNISONE 20 MG/1
TABLET ORAL
Qty: 10 TABLET | Refills: 0 | Status: SHIPPED | OUTPATIENT
Start: 2023-01-11 | End: 2023-04-06

## 2023-01-11 RX ORDER — ALBUTEROL SULFATE 0.83 MG/ML
2.5 SOLUTION RESPIRATORY (INHALATION) ONCE
Status: COMPLETED | OUTPATIENT
Start: 2023-01-11 | End: 2023-01-11

## 2023-01-11 RX ADMIN — ALBUTEROL SULFATE 2.5 MG: 0.83 SOLUTION RESPIRATORY (INHALATION) at 19:53

## 2023-01-12 ENCOUNTER — ANCILLARY PROCEDURE (OUTPATIENT)
Dept: MAMMOGRAPHY | Facility: CLINIC | Age: 43
End: 2023-01-12
Attending: FAMILY MEDICINE
Payer: COMMERCIAL

## 2023-01-12 DIAGNOSIS — N64.89 BREAST ASYMMETRY: ICD-10-CM

## 2023-01-12 PROCEDURE — 77061 BREAST TOMOSYNTHESIS UNI: CPT | Mod: LT

## 2023-01-12 PROCEDURE — 77065 DX MAMMO INCL CAD UNI: CPT | Mod: LT

## 2023-01-12 NOTE — PROGRESS NOTES
Assessment/Plan:   Persistent cough  Gastroesophageal reflux disease, unspecified whether esophagitis present  Persistent cough for 3 weeks then worse this week associated with increased congestion, fatigue, myalgia. Tight chest on exam. Improved a little with albuterol neb in the office.   Consider new viral illness on prior cough vs secondary bacterial bronchitis or pneumonia.   CXR obtained and view by me showed no acute infiltrates.   Will treat with azithromycin, prednisone and albuterol inhaler with spacer. Tessalon perles as needed.   She will check covid test at home.   Elevated blood pressure likely due to decongestant use - stop that.   - XR Chest 2 Views; Future  - albuterol (PROVENTIL) neb solution 2.5 mg administered in the office  - Miscellaneous Order for DME - ONLY FOR DME  - albuterol (PROAIR HFA/PROVENTIL HFA/VENTOLIN HFA) 108 (90 Base) MCG/ACT inhaler; Inhale 2 puffs into the lungs every 4 hours as needed for shortness of breath, wheezing or cough  Dispense: 18 g; Refill: 0  - azithromycin (ZITHROMAX) 250 MG tablet; Take 2 tablets (500 mg) by mouth daily for 1 day, THEN 1 tablet (250 mg) daily for 4 days.  Dispense: 6 tablet; Refill: 0  - predniSONE (DELTASONE) 20 MG tablet; 40mg daily for 5 days  Dispense: 10 tablet; Refill: 0  - benzonatate (TESSALON) 100 MG capsule; Take 1 capsule (100 mg) by mouth 3 times daily as needed for cough  Dispense: 30 capsule; Refill: 0    I discussed red flag symptoms, return precautions to clinic/ER and follow up care with patient/parent.  Expected clinical course, symptomatic cares advised. Questions answered. Patient/parent amenable with plan.    Limit decongestant use ('sinus' medication) due to affect on blood pressure and sleep.     Do another 1-2 covid tests at home on different days    Azithromycin as directed. Take with food.   Eat yogurt or probiotics while on antibiotic    Prilosec 20mg 1-2 per day for 2-4 weeks, has at home    Benzonatate perles one  every 8 hours if needed for cough    Albuterol inhaler 2 puffs with spacer every 4 hours as needed for cough or wheeze or shortness of breath.     If cough persists consider filling the Rx for Prednisone and take as directed.     Recheck as needed if worse or no better.     Subjective:     Claire De La Cruz is a 42 year old female who presents for evaluation of persistent cough.   Her illness started 12/18/22 with cough that was triggered by a severe episode of heartburn (had spicy foods). Dry cough with hoarseness. Was very persistent but was slowly improving.   Exposed to her ill  and then on Saturday the cough became worse. Associated with nasal congestion, productive cough, myalgia, chest pain from increased hard cough. Stress incontinence worse.   Has tried Halls, sips of water, mucinex sinus, nothing settles the cough.   She had Covid at the end of October - just fever for 1 day and fatigue , resolved before cough started in December.   No N/V/D. No leg swelling or calf pain. GERD is not persistent, usually triggered by spicy foods.    is improved.     No Known Allergies     Current Outpatient Medications   Medication     albuterol (PROAIR HFA/PROVENTIL HFA/VENTOLIN HFA) 108 (90 Base) MCG/ACT inhaler     azithromycin (ZITHROMAX) 250 MG tablet     benzonatate (TESSALON) 100 MG capsule     hydrochlorothiazide (HYDRODIURIL) 25 MG tablet     lisinopril (ZESTRIL) 40 MG tablet     predniSONE (DELTASONE) 20 MG tablet     alcohol swab prep pads     amLODIPine (NORVASC) 5 MG tablet     blood glucose (NO BRAND SPECIFIED) test strip     blood glucose monitoring (NO BRAND SPECIFIED) meter device kit     thin (NO BRAND SPECIFIED) lancets     No current facility-administered medications for this visit.     Patient Active Problem List   Diagnosis     Methylenetetrahydrofolate Reductase Deficiency     Vitamin D Deficiency     Hypertension     Menorrhagia with regular cycle     History of abnormal pap     Diabetes  mellitus, type 2 (H)       Objective:     BP (!) 160/96   Pulse 95   Temp 98.2  F (36.8  C) (Oral)   Resp 18   LMP 12/28/2022 (Exact Date)   SpO2 95%     Physical    General Appearance: Alert, pleasant, no distress, aVSS  Head: Normocephalic, without obvious abnormality, atraumatic  Eyes: Conjunctivae are normal.   Ears: Normal TMs and external ear canals, both ears  Nose: No significant congestion.  Throat: Throat is normal.  No exudate.  No vesicular lesions  Neck: No adenopathy  Lungs: Clear to auscultation bilaterally, deep breaths trigger cough, respirations unlabored  Heart: Regular rate and rhythm  Extremities: No lower extremity edema  Skin: Skin color, texture, turgor normal, no rashes or lesions  Psychiatric: Patient has a normal mood and affect.     Results for orders placed or performed in visit on 01/11/23   XR Chest 2 Views     Status: None    Narrative    EXAM: XR CHEST 2 VIEWS  LOCATION: [facility]  DATE/TIME: 1/11/2023 7:50 PM    INDICATION: worsening cough, started 3 weeks ago  COMPARISON: 1/11/2010      Impression    IMPRESSION: Negative chest.       This note has been dictated in part using voice recognition software.  Any grammatical or context distortions are unintentional and inherent to the software.  Please feel free to contact me directly for clarification if needed.

## 2023-01-12 NOTE — PATIENT INSTRUCTIONS
Limit decongestant use ('sinus' medication) due to affect on blood pressure and sleep.     Do another 1-2 covid tests at home on different days.     Azithromycin as directed. Take with food.   Eat yogurt or probiotics while on antibiotic    Prilosec 20mg 1-2 per day for 2-4 weeks    Benzonatate perles one every 8 hours if needed for cough    Albuterol inhaler 2 puffs with spacer every 4 hours as needed for cough or wheeze or shortness of breath.     If cough persists consider filling the Rx for Prednisone and take as directed.     Recheck as needed if worse or no better.

## 2023-01-24 ENCOUNTER — OFFICE VISIT (OUTPATIENT)
Dept: PODIATRY | Facility: CLINIC | Age: 43
End: 2023-01-24
Attending: FAMILY MEDICINE
Payer: COMMERCIAL

## 2023-01-24 ENCOUNTER — TELEPHONE (OUTPATIENT)
Dept: VASCULAR SURGERY | Facility: CLINIC | Age: 43
End: 2023-01-24

## 2023-01-24 VITALS — RESPIRATION RATE: 18 BRPM | HEART RATE: 68 BPM | DIASTOLIC BLOOD PRESSURE: 72 MMHG | SYSTOLIC BLOOD PRESSURE: 126 MMHG

## 2023-01-24 DIAGNOSIS — B35.1 ONYCHOMYCOSIS: ICD-10-CM

## 2023-01-24 LAB
ALBUMIN SERPL BCG-MCNC: 4.3 G/DL (ref 3.5–5.2)
ALP SERPL-CCNC: 69 U/L (ref 35–104)
ALT SERPL W P-5'-P-CCNC: 50 U/L (ref 10–35)
AST SERPL W P-5'-P-CCNC: 37 U/L (ref 10–35)
BILIRUB DIRECT SERPL-MCNC: <0.2 MG/DL (ref 0–0.3)
BILIRUB SERPL-MCNC: 0.3 MG/DL
PROT SERPL-MCNC: 7.2 G/DL (ref 6.4–8.3)

## 2023-01-24 PROCEDURE — 99203 OFFICE O/P NEW LOW 30 MIN: CPT | Performed by: PODIATRIST

## 2023-01-24 PROCEDURE — 36415 COLL VENOUS BLD VENIPUNCTURE: CPT | Performed by: PODIATRIST

## 2023-01-24 PROCEDURE — 80076 HEPATIC FUNCTION PANEL: CPT | Performed by: PODIATRIST

## 2023-01-24 ASSESSMENT — PAIN SCALES - GENERAL: PAINLEVEL: MILD PAIN (2)

## 2023-01-24 NOTE — TELEPHONE ENCOUNTER
I reviewed the patient's hepatic results which indicate elevated AST/ALT. Based on these results I am unable to begin oral terbinafine and recommend she contact her primary provider to discuss further.

## 2023-01-24 NOTE — LETTER
1/24/2023         RE: Claire De La Cruz  [address]  [facility] [address]        Dear Colleague,    Thank you for referring your patient, Claire De La Cruz, to the [facility]. Please see a copy of my visit note below.          FOOT AND ANKLE SURGERY/PODIATRY CONSULT NOTE         ASSESSMENT: Onychomycosis      TREATMENT:  -I discussed with the patient that the nail discoloration on the 2nd digit right foot is consistent with a nail fungal infection.     -I reviewed topical and oral anti-fungal medication today including possible elevated LFT's with oral medication. Referred for hepatic panel. If ok, I will start her on terbinafine 250 mg qday x90 days. Repeat LFT's 6 weeks after beginning medication.    -Patient's questions invited and answered. She was encouraged to call my office with any further questions or concerns.     Jacob Painter DPM  [facility]/Foot & Ankle Surgery      HPI: I was asked to see Claire De La Cruz today for a fungal nail on the 2nd digit right foot. She reports trying over the counter medication for several years with no improvement.       Past Medical History:   Diagnosis Date     Abnormal Pap smear of cervix      JE II (cervical intraepithelial neoplasia II) 11/14/2008    10/14/08 ASCUS with positive HPV 11/14/08 [place] JE II 12/3/08 LEEP mild squamous atypia, ECC neg 4/22/11 NIL 7/25/15 NIL pap, neg HPV 11/11/21 NIL pap, neg HPV. Plan: cotest in 1 year     Hypertension      Postpartum hemorrhage 2010         Social History     Socioeconomic History     Marital status: Single     Spouse name: Not on file     Number of children: Not on file     Years of education: Not on file     Highest education level: Not on file   Occupational History     Not on file   Tobacco Use     Smoking status: Never     Smokeless tobacco: Never   Vaping Use     Vaping Use: Never used   Substance and Sexual Activity     Alcohol use: Yes     Comment: Alcoholic  Drinks/day: Seldom     Drug use: No     Sexual activity: Yes     Partners: Male     Birth control/protection: I.U.D.   Other Topics Concern     Not on file   Social History Narrative     Not on file     Social Determinants of Health     Financial Resource Strain: Not on file   Food Insecurity: Not on file   Transportation Needs: Not on file   Physical Activity: Not on file   Stress: Not on file   Social Connections: Not on file   Intimate Partner Violence: Not on file   Housing Stability: Not on file          No Known Allergies      MEDICATIONS:   Current Outpatient Medications   Medication     albuterol (PROAIR HFA/PROVENTIL HFA/VENTOLIN HFA) 108 (90 Base) MCG/ACT inhaler     benzonatate (TESSALON) 100 MG capsule     hydrochlorothiazide (HYDRODIURIL) 25 MG tablet     lisinopril (ZESTRIL) 40 MG tablet     predniSONE (DELTASONE) 20 MG tablet     alcohol swab prep pads     amLODIPine (NORVASC) 5 MG tablet     blood glucose (NO BRAND SPECIFIED) test strip     blood glucose monitoring (NO BRAND SPECIFIED) meter device kit     thin (NO BRAND SPECIFIED) lancets     No current facility-administered medications for this visit.        Family History   Problem Relation Age of Onset     No Known Problems Mother      Hypertension Father      Diabetes Father      Clotting Disorder Maternal Grandfather      Breast Cancer No family hx of      Ovarian Cancer No family hx of           Review of Systems - 10 point Review of Systems is negative except for fungal nail which is noted in HPI.    OBJECTIVE:  Appearance: alert, well appearing, and in no distress.    VITAL SIGNS: /72   Pulse 68   Resp 18   LMP 12/28/2022 (Exact Date)       General appearance: Patient is alert and fully cooperative with history & exam.  No sign of distress is noted during the visit.     Psychiatric: Affect is pleasant & appropriate.  Patient appears motivated to improve health.     Respiratory: Breathing is regular & unlabored while sitting.      HEENT: Hearing is intact to spoken word.  Speech is clear.  No gross evidence of visual impairment that would impact ambulation.      Vascular: Dorsalis pedis and posterior tibial pulses are palpable. There is pedal hair growth right.  CFT < 3 sec from anterior tibial surface to distal digits right. There is no appreciable edema noted.  Dermatologic: Dystrophic changes 2nd digit right foot.   Neurologic: All epicritic and proprioceptive sensations are grossly intact right.  Musculoskeletal: Contracted digits right foot.             Again, thank you for allowing me to participate in the care of your patient.        Sincerely,        Jacob Painter DPM

## 2023-01-24 NOTE — PATIENT INSTRUCTIONS
our doctor has recommended you start taking Terbinafine. Terbinafine is an antifungal medication used to treat certain types of fungal infections. You will take this medication everyday for 90 days.     Prior to starting Terbinafine you will need to have your labs drawn. You can stop into the lab located on the first floor to have these drawn.     We will repeat the labs again after you have been taking the medication for 6 weeks. You can walk-in to the lab located on the first floor on or around  3/07/2023        Terbinafine tablets  Brand Names: Lamisil, Terbinex   What is this medicine?  TERBINAFINE (TER bin a feen) is an antifungal medicine. It is used to treat certain kinds of fungal or yeast infections.  How should I use this medicine?  Take this medicine by mouth with a full glass of water. Follow the directions on the prescription label. You can take this medicine with food or on an empty stomach. Take your medicine at regular intervals. Do not take your medicine more often than directed. Do not skip doses or stop your medicine early even if you feel better. Do not stop taking except on your doctor's advice. Talk to your pediatrician regarding the use of this medicine in children. Special care may be needed.  What side effects may I notice from receiving this medicine?  Side effects that you should report to your doctor or health care professional as soon as possible:  allergic reactions like skin rash or hives, swelling of the face, lips, or tongue  changes in vision  dark urine  fever or infection  general ill feeling or flu-like symptoms  light-colored stools  loss of appetite, nausea  redness, blistering, peeling or loosening of the skin, including inside the mouth  right upper belly pain  unusually weak or tired  yellowing of the eyes or skin  Side effects that usually do not require medical attention (report to your doctor or health care professional if they continue or are bothersome):  changes in  taste  diarrhea  hair loss  muscle or joint pain  stomach gas  stomach upset  What may interact with this medicine?  Do not take this medicine with any of the following medications:  thioridazine  This medicine may also interact with the following medications:  beta-blockers  caffeine  cimetidine  cyclosporine  medicines for depression, anxiety, or psychotic disturbances  medicines for fungal infections like fluconazole and ketoconazole  medicines for irregular heartbeat like amiodarone, flecainide and propafenone  rifampin  warfarin  What if I miss a dose?  If you miss a dose, take it as soon as you can. If it is almost time for your next dose, take only that dose. Do not take double or extra doses.  Where should I keep my medicine?  Keep out of the reach of children.  Store at room temperature below 25 degrees C (77 degrees F). Protect from light. Throw away any unused medicine after the expiration date.  What should I tell my health care provider before I take this medicine?  They need to know if you have any of these conditions:  drink alcoholic beverages  kidney disease  liver disease  an unusual or allergic reaction to terbinafine, other medicines, foods, dyes, or preservatives  pregnant or trying to get pregnant  breast-feeding  What should I watch for while using this medicine?  Visit your doctor or health care provider regularly. Tell your doctor right away if you have nausea or vomiting, loss of appetite, stomach pain on your right upper side, yellow skin, dark urine, light stools, or are over tired. Some fungal infections need many weeks or months of treatment to cure. If you are taking this medicine for a long time, you will need to have important blood work done.

## 2023-01-24 NOTE — PROGRESS NOTES
FOOT AND ANKLE SURGERY/PODIATRY CONSULT NOTE         ASSESSMENT: Onychomycosis      TREATMENT:  -I discussed with the patient that the nail discoloration on the 2nd digit right foot is consistent with a nail fungal infection.     -I reviewed topical and oral anti-fungal medication today including possible elevated LFT's with oral medication. Referred for hepatic panel. If ok, I will start her on terbinafine 250 mg qday x90 days. Repeat LFT's 6 weeks after beginning medication.    -Patient's questions invited and answered. She was encouraged to call my office with any further questions or concerns.     Jacob Painter DPM  [facility] Podiatry/Foot & Ankle Surgery      HPI: I was asked to see Claire LOZADA De La Cruz today for a fungal nail on the 2nd digit right foot. She reports trying over the counter medication for several years with no improvement.       Past Medical History:   Diagnosis Date     Abnormal Pap smear of cervix      JE II (cervical intraepithelial neoplasia II) 11/14/2008    10/14/08 ASCUS with positive HPV 11/14/08 Colpo JE II 12/3/08 LEEP mild squamous atypia, ECC neg 4/22/11 NIL 7/25/15 NIL pap, neg HPV 11/11/21 NIL pap, neg HPV. Plan: cotest in 1 year     Hypertension      Postpartum hemorrhage 2010         Social History     Socioeconomic History     Marital status: Single     Spouse name: Not on file     Number of children: Not on file     Years of education: Not on file     Highest education level: Not on file   Occupational History     Not on file   Tobacco Use     Smoking status: Never     Smokeless tobacco: Never   Vaping Use     Vaping Use: Never used   Substance and Sexual Activity     Alcohol use: Yes     Comment: Alcoholic Drinks/day: Seldom     Drug use: No     Sexual activity: Yes     Partners: Male     Birth control/protection: I.U.D.   Other Topics Concern     Not on file   Social History Narrative     Not on file     Social Determinants of Health     Financial Resource  Strain: Not on file   Food Insecurity: Not on file   Transportation Needs: Not on file   Physical Activity: Not on file   Stress: Not on file   Social Connections: Not on file   Intimate Partner Violence: Not on file   Housing Stability: Not on file          No Known Allergies      MEDICATIONS:   Current Outpatient Medications   Medication     albuterol (PROAIR HFA/PROVENTIL HFA/VENTOLIN HFA) 108 (90 Base) MCG/ACT inhaler     benzonatate (TESSALON) 100 MG capsule     hydrochlorothiazide (HYDRODIURIL) 25 MG tablet     lisinopril (ZESTRIL) 40 MG tablet     predniSONE (DELTASONE) 20 MG tablet     alcohol swab prep pads     amLODIPine (NORVASC) 5 MG tablet     blood glucose (NO BRAND SPECIFIED) test strip     blood glucose monitoring (NO BRAND SPECIFIED) meter device kit     thin (NO BRAND SPECIFIED) lancets     No current facility-administered medications for this visit.        Family History   Problem Relation Age of Onset     No Known Problems Mother      Hypertension Father      Diabetes Father      Clotting Disorder Maternal Grandfather      Breast Cancer No family hx of      Ovarian Cancer No family hx of           Review of Systems - 10 point Review of Systems is negative except for fungal nail which is noted in HPI.    OBJECTIVE:  Appearance: alert, well appearing, and in no distress.    VITAL SIGNS: /72   Pulse 68   Resp 18   LMP 12/28/2022 (Exact Date)       General appearance: Patient is alert and fully cooperative with history & exam.  No sign of distress is noted during the visit.     Psychiatric: Affect is pleasant & appropriate.  Patient appears motivated to improve health.     Respiratory: Breathing is regular & unlabored while sitting.     HEENT: Hearing is intact to spoken word.  Speech is clear.  No gross evidence of visual impairment that would impact ambulation.      Vascular: Dorsalis pedis and posterior tibial pulses are palpable. There is pedal hair growth right.  CFT < 3 sec from  anterior tibial surface to distal digits right. There is no appreciable edema noted.  Dermatologic: Dystrophic changes 2nd digit right foot.   Neurologic: All epicritic and proprioceptive sensations are grossly intact right.  Musculoskeletal: Contracted digits right foot.

## 2023-01-25 DIAGNOSIS — R05.3 PERSISTENT COUGH: ICD-10-CM

## 2023-01-25 RX ORDER — ALBUTEROL SULFATE 90 UG/1
2 AEROSOL, METERED RESPIRATORY (INHALATION) EVERY 4 HOURS PRN
Qty: 8.5 G | Refills: 1 | Status: SHIPPED | OUTPATIENT
Start: 2023-01-25 | End: 2023-04-06

## 2023-01-26 NOTE — TELEPHONE ENCOUNTER
"Please review    Last Written Prescription Date:  1/11/23  Last Fill Quantity: 18,  # refills: 0   Last office visit provider:  1/4/23     Requested Prescriptions   Pending Prescriptions Disp Refills     albuterol (PROAIR HFA/PROVENTIL HFA/VENTOLIN HFA) 108 (90 Base) MCG/ACT inhaler [Pharmacy Med Name: ALBUTEROL HFA INH (200 PUFFS)8.5GM] 8.5 g      Sig: INHALE 2 PUFFS INTO THE LUNGS EVERY 4 HOURS AS NEEDED FOR SHORTNESS OF BREATH, WHEEZING OR COUGH.       Asthma Maintenance Inhalers - Anticholinergics Passed - 1/25/2023  3:56 AM        Passed - Patient is age 12 years or older        Passed - Recent (12 mo) or future (30 days) visit within the authorizing provider's specialty     Patient has had an office visit with the authorizing provider or a provider within the authorizing providers department within the previous 12 mos or has a future within next 30 days. See \"Patient Info\" tab in inbasket, or \"Choose Columns\" in Meds & Orders section of the refill encounter.              Passed - Medication is active on med list       Short-Acting Beta Agonist Inhalers Protocol  Passed - 1/25/2023  3:56 AM        Passed - Patient is age 12 or older        Passed - Recent (12 mo) or future (30 days) visit within the authorizing provider's specialty     Patient has had an office visit with the authorizing provider or a provider within the authorizing providers department within the previous 12 mos or has a future within next 30 days. See \"Patient Info\" tab in inbasket, or \"Choose Columns\" in Meds & Orders section of the refill encounter.              Passed - Medication is active on med list             Carlie Valencia RN 01/25/23 8:06 PM  "

## 2023-01-30 DIAGNOSIS — R74.01 TRANSAMINITIS: Primary | ICD-10-CM

## 2023-02-09 ENCOUNTER — HOSPITAL ENCOUNTER (OUTPATIENT)
Dept: ULTRASOUND IMAGING | Facility: HOSPITAL | Age: 43
Discharge: HOME | End: 2023-02-09
Attending: FAMILY MEDICINE | Admitting: FAMILY MEDICINE
Payer: COMMERCIAL

## 2023-02-09 DIAGNOSIS — R74.01 TRANSAMINITIS: ICD-10-CM

## 2023-02-09 PROCEDURE — 76705 ECHO EXAM OF ABDOMEN: CPT

## 2023-02-10 ENCOUNTER — MYC MEDICAL ADVICE (OUTPATIENT)
Dept: FAMILY MEDICINE | Facility: CLINIC | Age: 43
End: 2023-02-10
Payer: COMMERCIAL

## 2023-02-10 DIAGNOSIS — K76.0 HEPATIC STEATOSIS: Primary | ICD-10-CM

## 2023-02-10 DIAGNOSIS — I10 ESSENTIAL HYPERTENSION: ICD-10-CM

## 2023-02-10 DIAGNOSIS — E11.9 TYPE 2 DIABETES MELLITUS WITHOUT COMPLICATION, WITHOUT LONG-TERM CURRENT USE OF INSULIN (H): ICD-10-CM

## 2023-02-10 DIAGNOSIS — E66.9 OBESITY WITH SERIOUS COMORBIDITY, UNSPECIFIED CLASSIFICATION, UNSPECIFIED OBESITY TYPE: ICD-10-CM

## 2023-02-15 DIAGNOSIS — I10 HYPERTENSION, UNSPECIFIED TYPE: ICD-10-CM

## 2023-02-17 RX ORDER — LISINOPRIL 40 MG/1
TABLET ORAL
Qty: 90 TABLET | Refills: 3 | Status: SHIPPED | OUTPATIENT
Start: 2023-02-17 | End: 2024-02-01

## 2023-02-17 RX ORDER — HYDROCHLOROTHIAZIDE 25 MG/1
TABLET ORAL
Qty: 90 TABLET | Refills: 3 | Status: SHIPPED | OUTPATIENT
Start: 2023-02-17 | End: 2024-02-01

## 2023-02-17 NOTE — TELEPHONE ENCOUNTER
"Routing refill request to provider for review/approval because:  Per last OV note: Check ambulatory blood pressure readings daily for 1-2 weeks and send results via [employer]  Add amlodipine 5 mg daily    Last Written Prescription Date:  11/11/21  Last Fill Quantity: 90,  # refills: 3   Last office visit provider:  1/11/23     Requested Prescriptions   Pending Prescriptions Disp Refills     lisinopril (ZESTRIL) 40 MG tablet [Pharmacy Med Name: LISINOPRIL 40MG TABLETS] 90 tablet 3     Sig: TAKE 1 TABLET(40 MG) BY MOUTH DAILY       ACE Inhibitors (Including Combos) Protocol Passed - 2/15/2023  8:46 AM        Passed - Blood pressure under 140/90 in past 12 months     BP Readings from Last 3 Encounters:   01/24/23 126/72   01/11/23 (!) 160/96   01/04/23 128/88                 Passed - Recent (12 mo) or future (30 days) visit within the authorizing provider's specialty     Patient has had an office visit with the authorizing provider or a provider within the authorizing providers department within the previous 12 mos or has a future within next 30 days. See \"Patient Info\" tab in inbasket, or \"Choose Columns\" in Meds & Orders section of the refill encounter.              Passed - Medication is active on med list        Passed - Patient is age 18 or older        Passed - No active pregnancy on record        Passed - Normal serum creatinine on file in past 12 months     Recent Labs   Lab Test 01/02/23  0808   CR 0.78       Ok to refill medication if creatinine is low          Passed - Normal serum potassium on file in past 12 months     Recent Labs   Lab Test 01/02/23  0808   POTASSIUM 4.4             Passed - No positive pregnancy test within past 12 months           hydrochlorothiazide (HYDRODIURIL) 25 MG tablet [Pharmacy Med Name: HYDROCHLOROTHIAZIDE 25MG TABLETS] 90 tablet 3     Sig: TAKE 1 TABLET(25 MG) BY MOUTH DAILY       Diuretics (Including Combos) Protocol Passed - 2/15/2023  8:46 AM        Passed - Blood pressure " "under 140/90 in past 12 months     BP Readings from Last 3 Encounters:   01/24/23 126/72   01/11/23 (!) 160/96   01/04/23 128/88                 Passed - Recent (12 mo) or future (30 days) visit within the authorizing provider's specialty     Patient has had an office visit with the authorizing provider or a provider within the authorizing providers department within the previous 12 mos or has a future within next 30 days. See \"Patient Info\" tab in inbasket, or \"Choose Columns\" in Meds & Orders section of the refill encounter.              Passed - Medication is active on med list        Passed - Patient is age 18 or older        Passed - No active pregancy on record        Passed - Normal serum creatinine on file in past 12 months     Recent Labs   Lab Test 01/02/23  0808   CR 0.78              Passed - Normal serum potassium on file in past 12 months     Recent Labs   Lab Test 01/02/23  0808   POTASSIUM 4.4                    Passed - Normal serum sodium on file in past 12 months     Recent Labs   Lab Test 01/02/23  0808                 Passed - No positive pregnancy test in past 12 months             CARRINGTON BELTRE RN 02/17/23 11:09 AM  "

## 2023-02-28 ENCOUNTER — E-VISIT (OUTPATIENT)
Dept: URGENT CARE | Facility: CLINIC | Age: 43
End: 2023-02-28
Payer: COMMERCIAL

## 2023-02-28 DIAGNOSIS — N39.0 ACUTE UTI (URINARY TRACT INFECTION): Primary | ICD-10-CM

## 2023-02-28 PROCEDURE — 99421 OL DIG E/M SVC 5-10 MIN: CPT | Performed by: NURSE PRACTITIONER

## 2023-02-28 RX ORDER — NITROFURANTOIN 25; 75 MG/1; MG/1
100 CAPSULE ORAL 2 TIMES DAILY
Qty: 10 CAPSULE | Refills: 0 | Status: SHIPPED | OUTPATIENT
Start: 2023-02-28 | End: 2023-03-05

## 2023-02-28 NOTE — PATIENT INSTRUCTIONS
Dear Claire De La Cruz    After reviewing your responses, I've been able to diagnose you with a urinary tract infection, which is a common infection of the bladder with bacteria.  This is not a sexually transmitted infection, though urinating immediately after intercourse can help prevent infections.  Drinking lots of fluids is also helpful to clear your current infection and prevent the next one.      I have sent a prescription for antibiotics to your pharmacy to treat this infection.    It is important that you take all of your prescribed medication even if your symptoms are improving after a few doses.  Taking all of your medicine helps prevent the symptoms from returning.     If your symptoms worsen, you develop pain in your back or stomach, develop fevers, or are not improving in 5 days, please contact your primary care provider for an appointment or visit any of our convenient Walk-in or Urgent Care Centers to be seen, which can be found on our website here.    Thanks again for choosing us as your health care partner,    GARRY Celestin CNP    Urinary Tract Infections in Women  Urinary tract infections (UTIs) are most often caused by bacteria. These bacteria enter the urinary tract. The bacteria may come from inside the body. Or they may travel from the skin outside the rectum or vagina into the urethra. Female anatomy makes it easy for bacteria from the bowel to enter a woman s urinary tract, which is the most common source of UTI. This means women develop UTIs more often than men. Pain in or around the urinary tract is a common UTI symptom. But the only way to know for sure if you have a UTI for the healthcare provider to test your urine. The two tests that may be done are the urinalysis and urine culture.     Types of UTIs    Cystitis. A bladder infection (cystitis) is the most common UTI in women. You may have urgent or frequent need to pee. You may also have pain, burning when you pee, and bloody  urine.    Urethritis. This is an inflamed urethra, which is the tube that carries urine from the bladder to outside the body. You may have lower stomach or back pain. You may also have urgent or frequent need to pee.    Pyelonephritis. This is a kidney infection. If not treated, it can be serious and damage your kidneys. In severe cases, you may need to stay in the hospital. You may have a fever and lower back pain.    Medicines to treat a UTI  Most UTIs are treated with antibiotics. These kill the bacteria. The length of time you need to take them depends on the type of infection. It may be as short as 3 days. If you have repeated UTIs, you may need a low-dose antibiotic for several months. Take antibiotics exactly as directed. Don t stop taking them until all of the medicine is gone. If you stop taking the antibiotic too soon, the infection may not go away. You may also develop a resistance to the antibiotic. This can make it much harder to treat.   Lifestyle changes to treat and prevent UTIs   The lifestyle changes below will help get rid of your UTI. They may also help prevent future UTIs.     Drink plenty of fluids. This includes water, juice, or other caffeine-free drinks. Fluids help flush bacteria out of your body.    Empty your bladder. Always empty your bladder when you feel the urge to pee. And always pee before going to sleep. Urine that stays in your bladder can lead to infection. Try to pee before and after sex as well.    Practice good personal hygiene. Wipe yourself from front to back after using the toilet. This helps keep bacteria from getting into the urethra.    Use condoms during sex. These help prevent UTIs caused by sexually transmitted bacteria. Also don't use spermicides during sex. These can increase the risk for UTIs. Choose other forms of birth control instead. For women who tend to get UTIs after sex, a low-dose of a preventive antibiotic may be used. Be sure to discuss this option with  your healthcare provider.    Follow up with your healthcare provider as directed. He or she may test to make sure the infection has cleared. If needed, more treatment may be started.  Nino last reviewed this educational content on 7/1/2019 2000-2021 [employer]. All rights reserved. This information is not intended as a substitute for professional medical care. Always follow your healthcare professional's instructions.

## 2023-04-06 DIAGNOSIS — E11.9 TYPE 2 DIABETES MELLITUS WITHOUT COMPLICATION, WITHOUT LONG-TERM CURRENT USE OF INSULIN (H): Primary | ICD-10-CM

## 2023-04-07 ENCOUNTER — LAB (OUTPATIENT)
Dept: LAB | Facility: CLINIC | Age: 43
End: 2023-04-07
Payer: COMMERCIAL

## 2023-04-07 DIAGNOSIS — B35.1 ONYCHOMYCOSIS: ICD-10-CM

## 2023-04-07 DIAGNOSIS — E11.9 TYPE 2 DIABETES MELLITUS WITHOUT COMPLICATION, WITHOUT LONG-TERM CURRENT USE OF INSULIN (H): ICD-10-CM

## 2023-04-07 LAB
ALBUMIN SERPL BCG-MCNC: 4.4 G/DL (ref 3.5–5.2)
ALP SERPL-CCNC: 73 U/L (ref 35–104)
ALT SERPL W P-5'-P-CCNC: 52 U/L (ref 10–35)
AST SERPL W P-5'-P-CCNC: 42 U/L (ref 10–35)
BILIRUB DIRECT SERPL-MCNC: <0.2 MG/DL (ref 0–0.3)
BILIRUB SERPL-MCNC: 0.4 MG/DL
HBA1C MFR BLD: 7.6 % (ref 0–5.6)
PROT SERPL-MCNC: 7.3 G/DL (ref 6.4–8.3)

## 2023-04-07 PROCEDURE — 83036 HEMOGLOBIN GLYCOSYLATED A1C: CPT

## 2023-04-07 PROCEDURE — 80076 HEPATIC FUNCTION PANEL: CPT

## 2023-04-07 PROCEDURE — 36415 COLL VENOUS BLD VENIPUNCTURE: CPT

## 2023-04-10 ENCOUNTER — TELEPHONE (OUTPATIENT)
Dept: VASCULAR SURGERY | Facility: CLINIC | Age: 43
End: 2023-04-10
Payer: COMMERCIAL

## 2023-04-10 ENCOUNTER — MYC MEDICAL ADVICE (OUTPATIENT)
Dept: FAMILY MEDICINE | Facility: CLINIC | Age: 43
End: 2023-04-10
Payer: COMMERCIAL

## 2023-04-10 DIAGNOSIS — E11.9 TYPE 2 DIABETES MELLITUS WITHOUT COMPLICATION, WITHOUT LONG-TERM CURRENT USE OF INSULIN (H): Primary | ICD-10-CM

## 2023-04-10 NOTE — TELEPHONE ENCOUNTER
----- Message from Jacob Painter DPM sent at 4/8/2023  9:01 AM CDT -----  Please contact the patient and ask them to contact their primary provider to discuss recent Hepatic panel which indicates elevated LFT's.     ----- Message -----  From: Lab, Background User  Sent: 4/7/2023   5:29 PM CDT  To: Jacob Painter DPM

## 2023-04-11 DIAGNOSIS — E11.9 TYPE 2 DIABETES MELLITUS WITHOUT COMPLICATION, WITHOUT LONG-TERM CURRENT USE OF INSULIN (H): Primary | ICD-10-CM

## 2023-04-11 RX ORDER — METFORMIN HCL 500 MG
500 TABLET, EXTENDED RELEASE 24 HR ORAL 2 TIMES DAILY WITH MEALS
Qty: 180 TABLET | Refills: 3 | Status: SHIPPED | OUTPATIENT
Start: 2023-04-11 | End: 2023-08-12

## 2023-04-11 NOTE — TELEPHONE ENCOUNTER
Called patient. Plan to start metformin. Recheck A1C in 3 months. She has testing supplies at home. Recheck liver enzymes in 6 months- if stable or improved, okay to start oral antifungal with close monitoring of LFTs.

## 2023-08-06 ENCOUNTER — HEALTH MAINTENANCE LETTER (OUTPATIENT)
Age: 43
End: 2023-08-06

## 2023-08-12 ENCOUNTER — VIRTUAL VISIT (OUTPATIENT)
Dept: URGENT CARE | Facility: CLINIC | Age: 43
End: 2023-08-12
Payer: COMMERCIAL

## 2023-08-12 DIAGNOSIS — N30.00 ACUTE CYSTITIS WITHOUT HEMATURIA: Primary | ICD-10-CM

## 2023-08-12 PROCEDURE — 99213 OFFICE O/P EST LOW 20 MIN: CPT | Mod: VID | Performed by: EMERGENCY MEDICINE

## 2023-08-12 RX ORDER — NITROFURANTOIN 25; 75 MG/1; MG/1
100 CAPSULE ORAL 2 TIMES DAILY
Qty: 10 CAPSULE | Refills: 0 | Status: SHIPPED | OUTPATIENT
Start: 2023-08-12 | End: 2023-08-17

## 2023-08-12 RX ORDER — PHENAZOPYRIDINE HYDROCHLORIDE 200 MG/1
200 TABLET, FILM COATED ORAL 3 TIMES DAILY PRN
Qty: 6 TABLET | Refills: 0 | Status: SHIPPED | OUTPATIENT
Start: 2023-08-12 | End: 2024-01-18

## 2023-08-12 NOTE — PROGRESS NOTES
Phone appointment:  Duration: 5 minutes        CHIEF COMPLAINT: Possible UTI.      HPI: Patient is a 42-year-old female whose had a 4-day history of symptoms consistent with previous UTIs.  She had dysuria, frequency, urgency.  No fever and feels generally well.  Last UTI greater than 1 year ago.      ROS: See HPI otherwise negative    No Known Allergies   Current Outpatient Medications   Medication Sig Dispense Refill    nitroFURantoin macrocrystal-monohydrate (MACROBID) 100 MG capsule Take 1 capsule (100 mg) by mouth 2 times daily for 5 days 10 capsule 0    phenazopyridine (PYRIDIUM) 200 MG tablet Take 1 tablet (200 mg) by mouth 3 times daily as needed for irritation 6 tablet 0    hydrochlorothiazide (HYDRODIURIL) 25 MG tablet TAKE 1 TABLET(25 MG) BY MOUTH DAILY 90 tablet 3    lisinopril (ZESTRIL) 40 MG tablet TAKE 1 TABLET(40 MG) BY MOUTH DAILY 90 tablet 3         PE: No acute distress on phone appointment.  Alert and oriented.  None dyspneic sounding speaking in full sentences.        TREATMENT: None.      ASSESSMENT: UTI by symptoms without concern for complicating clinical course.  Having a fair amount of pain so will include Pyridium.      DIAGNOSIS: UTI.      PLAN:  Macrobid, Pyridium as instructed.  Recheck 4 to 5 days if symptoms persist, sooner if worse.

## 2023-10-22 ENCOUNTER — MYC MEDICAL ADVICE (OUTPATIENT)
Dept: FAMILY MEDICINE | Facility: CLINIC | Age: 43
End: 2023-10-22
Payer: COMMERCIAL

## 2023-10-25 DIAGNOSIS — N63.20 MASS OF LEFT BREAST, UNSPECIFIED QUADRANT: Primary | ICD-10-CM

## 2023-11-08 ENCOUNTER — ANCILLARY PROCEDURE (OUTPATIENT)
Dept: MAMMOGRAPHY | Facility: CLINIC | Age: 43
End: 2023-11-08
Attending: FAMILY MEDICINE
Payer: COMMERCIAL

## 2023-11-08 DIAGNOSIS — N63.20 MASS OF LEFT BREAST, UNSPECIFIED QUADRANT: ICD-10-CM

## 2023-11-08 PROCEDURE — 76642 ULTRASOUND BREAST LIMITED: CPT | Mod: LT

## 2023-11-08 PROCEDURE — 77066 DX MAMMO INCL CAD BI: CPT

## 2023-11-15 ENCOUNTER — VIRTUAL VISIT (OUTPATIENT)
Dept: URGENT CARE | Facility: CLINIC | Age: 43
End: 2023-11-15
Payer: COMMERCIAL

## 2023-11-15 DIAGNOSIS — N30.01 ACUTE CYSTITIS WITH HEMATURIA: Primary | ICD-10-CM

## 2023-11-15 PROCEDURE — 99441 PR PHYSICIAN TELEPHONE EVALUATION 5-10 MIN: CPT | Mod: 93

## 2023-11-15 RX ORDER — NITROFURANTOIN 25; 75 MG/1; MG/1
100 CAPSULE ORAL 2 TIMES DAILY
Qty: 10 CAPSULE | Refills: 0 | Status: SHIPPED | OUTPATIENT
Start: 2023-11-15 | End: 2023-11-20

## 2023-11-15 RX ORDER — PHENAZOPYRIDINE HYDROCHLORIDE 100 MG/1
100 TABLET, FILM COATED ORAL 3 TIMES DAILY PRN
Qty: 9 TABLET | Refills: 0 | Status: SHIPPED | OUTPATIENT
Start: 2023-11-15 | End: 2024-01-18

## 2023-11-15 NOTE — PATIENT INSTRUCTIONS
Take antibiotic as directed. Alternate Tylenol and Ibuprofen as needed for fever or discomfort. May trial OTC Pyridium as needed. I recommend cranberry supplements as well. Increase fluids. Monitor for new or worsening symptoms.

## 2023-11-15 NOTE — PROGRESS NOTES
Claire is a 42 year old female who presents for a billable telephone visit.   ASSESSMENT/PLAN:  Diagnoses and all orders for this visit:    Acute cystitis with hematuria        UA is suggestive of acute cystitis. Lack of flank pain, fever, nausea make me less concerned for an ascending infection. Patient will be treated with Macrobid until cultures return and will adjust as necessary. Side effects reviewed and discussed.    Follow up with primary care provider with any problems, questions or concerns or if symptoms worsen or fail to improve. Patient agreed to plan and verbalized understanding.     SUBJECTIVE:  Claire calls with reports of urgency and painful urination x 3 days. She reports history of diarrhea over the weekend and believes this contributed. She has tried cranberry juice, water but it has not been helpful. She denies side or flank pain. She denies fevers.     ROS: Pertinent ROS neg other than the symptoms noted above in the HPI.     OBJECTIVE:  Vitals not done due to this being a virtual visit  GEN: No distress  RESP: No cough, no audible wheezing, able to talk in full sentences  Remainder of exam unable to be completed due to telephone visits    Vinod Byrne PA-C    Call length: 7 minutes  Provider location during call: Home  Patient location during call: Home

## 2023-12-05 ENCOUNTER — PATIENT OUTREACH (OUTPATIENT)
Dept: CARE COORDINATION | Facility: CLINIC | Age: 43
End: 2023-12-05
Payer: COMMERCIAL

## 2023-12-19 ENCOUNTER — PATIENT OUTREACH (OUTPATIENT)
Dept: CARE COORDINATION | Facility: CLINIC | Age: 43
End: 2023-12-19
Payer: COMMERCIAL

## 2023-12-24 ENCOUNTER — HEALTH MAINTENANCE LETTER (OUTPATIENT)
Age: 43
End: 2023-12-24

## 2024-01-17 DIAGNOSIS — I10 ESSENTIAL HYPERTENSION: ICD-10-CM

## 2024-01-17 RX ORDER — AMLODIPINE BESYLATE 5 MG/1
5 TABLET ORAL DAILY
Qty: 90 TABLET | Refills: 3 | Status: SHIPPED | OUTPATIENT
Start: 2024-01-17

## 2024-01-18 ENCOUNTER — TELEPHONE (OUTPATIENT)
Dept: FAMILY MEDICINE | Facility: CLINIC | Age: 44
End: 2024-01-18

## 2024-01-18 ENCOUNTER — VIRTUAL VISIT (OUTPATIENT)
Dept: FAMILY MEDICINE | Facility: CLINIC | Age: 44
End: 2024-01-18
Payer: COMMERCIAL

## 2024-01-18 DIAGNOSIS — E11.9 TYPE 2 DIABETES MELLITUS WITHOUT COMPLICATION, WITHOUT LONG-TERM CURRENT USE OF INSULIN (H): ICD-10-CM

## 2024-01-18 DIAGNOSIS — Z13.1 SCREENING FOR DIABETES MELLITUS: ICD-10-CM

## 2024-01-18 DIAGNOSIS — E72.10 DISORDER OF SULFUR-BEARING AMINO ACID METABOLISM (H): ICD-10-CM

## 2024-01-18 DIAGNOSIS — R53.83 OTHER FATIGUE: ICD-10-CM

## 2024-01-18 DIAGNOSIS — Z13.220 LIPID SCREENING: ICD-10-CM

## 2024-01-18 DIAGNOSIS — Z13.21 ENCOUNTER FOR VITAMIN DEFICIENCY SCREENING: ICD-10-CM

## 2024-01-18 DIAGNOSIS — Z00.00 LABORATORY EXAMINATION ORDERED AS PART OF A ROUTINE GENERAL MEDICAL EXAMINATION: ICD-10-CM

## 2024-01-18 DIAGNOSIS — R74.01 TRANSAMINITIS: ICD-10-CM

## 2024-01-18 DIAGNOSIS — U07.1 INFECTION DUE TO 2019 NOVEL CORONAVIRUS: Primary | ICD-10-CM

## 2024-01-18 PROCEDURE — 99442 PR PHYSICIAN TELEPHONE EVALUATION 11-20 MIN: CPT | Mod: 93 | Performed by: FAMILY MEDICINE

## 2024-01-18 RX ORDER — ALBUTEROL SULFATE 90 UG/1
2 AEROSOL, METERED RESPIRATORY (INHALATION) EVERY 6 HOURS PRN
Qty: 18 G | Refills: 0 | Status: SHIPPED | OUTPATIENT
Start: 2024-01-18 | End: 2024-02-05

## 2024-01-18 RX ORDER — CYCLOBENZAPRINE HCL 5 MG
5 TABLET ORAL 3 TIMES DAILY PRN
Qty: 30 TABLET | Refills: 0 | Status: SHIPPED | OUTPATIENT
Start: 2024-01-18 | End: 2024-02-01

## 2024-01-18 NOTE — PROGRESS NOTES
Claire is a 43 year old who is being evaluated via a billable telephone visit.      What phone number would you like to be contacted at? [phone]  How would you like to obtain your AVS? Johnna    Distant Location (provider location):  On-site    Claire was seen today for covid positive .    Diagnoses and all orders for this visit:    Infection due to 2019 novel coronavirus: Reviewed supportive cares. She has oximeter at home and monitoring oxygen levels. Severe cough with urinary incontinence and rib pain, likely from muscle strain from harsh cough. Continue tessalon perles, mucinex, supportive cares. Will trial beta agonist inhaler and muscle relaxer prn for her rib pain. Declines paxlovid- she had side effect of delirium when she took this during a previous covid infection.  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 Avoid hormonal birth control.     Type 2 diabetes mellitus without complication, without long-term current use of insulin (H): Due for A1C check, stopped metformin due to side effects of brain fog. She has upcoming physical in a few weeks- placed lab orders so she can have these done prior to appointment. She is possibly interested in GLP-1 agonist- she will check with insurance coverage prior to appointment so we can further discuss.    Lipid screening  -     Lipid panel reflex to direct LDL Fasting; Future    Laboratory examination ordered as part of a routine general medical examination  -     CBC with platelets; Future    Transaminitis: Recheck LFTs.  -     Comprehensive metabolic panel (BMP + Alb, Alk Phos, ALT, AST, Total. Bili,  "TP); Future    Screening for diabetes mellitus  -     Hemoglobin A1c; Future    Other fatigue: check TSH.  -     TSH with free T4 reflex; Future    Encounter for vitamin deficiency screening  -     Vitamin D deficiency screening; Future      Subjective   Claire is a 43 year old, presenting for the following health issues:  COVID Positive  (Started 3 to 4 weeks ago with no improvements with no Covid came back from vacation on Monday and caught covid. Taken ibuprofen , vicks )      1/18/2024     2:31 PM   Additional Questions   Roomed by Martah LOZADA MA   Accompanied by SELF     History of Present Illness       Reason for visit:  Chest /Rib pain woth cough for over 3weeks  Symptom onset:  3-4 weeks ago  Symptoms include:  Chest/Rib/muscle pain cough lightheaded lethargic  Symptom intensity:  Moderate  Symptom progression:  Staying the same  Had these symptoms before:  No  What makes it worse:  Coughing standing for period of time    She eats 0-1 servings of fruits and vegetables daily.She consumes 1 sweetened beverage(s) daily.She exercises with enough effort to increase her heart rate 10 to 19 minutes per day.  She exercises with enough effort to increase her heart rate 3 or less days per week.   She is taking medications regularly.     Sharifa- started with viral URI symptoms.  Covid was negative at that time  Turned into \"deep bronchitis cough\"  Tried some benzonatate for cough- this was helping at bedtime   Went on cruise- was feeling better.  Came back on Sunday- has been having symptoms. Covid-19 positive.  Cough is worse than before  Worse with deep breath  \"Rib pain\" from coughing  No wheezing  O2 sats- 95%  Drinking lots of fluids  Took paxlovid with previous covid- had delirium- does not wish to use this again  No fever in a few days  Using over the counter mucinex    Stopped taking metformin- was having brain fog at the time  +fatigued            Objective    Vitals - Patient Reported  Weight (Patient " "Reported): 102.5 kg (226 lb)  Height (Patient Reported): 172.7 cm (5' 8\")  BMI (Based on Pt Reported Ht/Wt): 34.36        Physical Exam   General: Alert and no distress //Respiratory: No audible wheeze, cough, or shortness of breath // Psychiatric:  Appropriate affect, tone, and pace of words            Phone call duration: 15 minutes  Signed Electronically by: Sharifa Naylor MD    "

## 2024-01-18 NOTE — COMMUNITY RESOURCES LIST (ENGLISH)
01/18/2024   [facility]  N/A  For questions about this resource list or additional care needs, please contact your primary care clinic or care manager.  Phone: [phone]   Email: N/A   Address: [facility] [place] [address]   Hours: N/A        Hotlines and Helplines       Hotline - Housing crisis  1  [facility] Distance: 23.88 miles      Phone/Virtual   [address] [place] [address]  Language: English  Hours: Mon - Sun Open 24 Hours   Phone: (921) 420-2819 Email: communications@[facility] Website: https://[facility].org/oursaviourshousing/     2  [facility] (Main Office) Distance: 24.7 miles      Phone/Virtual   [address] [place] [address]  Language: English  Hours: Mon - Sun Open 24 Hours   Phone: (557) 117-3638 Email: info@[facility].org Website: http://[facility].org          Housing       Coordinated Entry access point  3  [facility] Distance: 16.04 miles      In-Person, Phone/Virtual   [address] [place] [address]  Language: English  Hours: Mon - Fri 8:30 AM - 4:30 PM  Fees: Free   Phone: (988) 850-8630 Email: info@[facility].org Website: https://www.[facility].org/locations/[facility]/     4  [facility] - Housing Services Distance: 24.06 miles      In-Person   [address] [place] [address]  Language: English  Hours: Mon - Fri 9:00 AM - 5:00 PM  Fees: Free   Phone: (940) 295-1762 Email: housing@[facility].org Website: http://www.[facility].org/housing     Drop-in center or day shelter  5  [facility] Distance: 14.71 miles      In-Person   [address] [place] [address]  Language: English  Hours: Mon - Fri 9:00 AM - 4:00 PM  Fees: Free   Phone: (830) 414-6619 Email: ave@[employer].org Website: http://listeninghouse.org     6  [facility] Distance: 23.93 miles       In-Person   [address] [place] [address]  Language: English, Salvadorean, Polish  Hours: Mon - Sat 7:00 AM - 3:00 PM  Fees: Free, Self Pay   Phone: (468) 908-7312 Email: info@[employer] Website: https://www.[employer].org/locations/opportunity-center/     Housing search assistance  7  [facility] Information - Housing search assistance Distance: 5.99 miles      In-Person, Phone/Virtual   [address] [place] [address]  Language: English  Hours: Mon - Fri 8:00 AM - 4:30 PM  Fees: Free   Phone: (624) 314-8975 Email: willow@[facility]. Website: https://www.[facility]./469/Community-Services     8  [facility] - Housing Resources Distance: 8.94 miles      In-Person, Phone/Virtual   [address] [place] [address]  Language: English  Hours: Mon - Fri 8:00 AM - 4:30 PM  Fees: Free   Phone: (171) 460-3859 Email: office@[employer] Website: http://[facility]"[employer].org     Shelter for families  9  [facility] Distance: 15.86 miles      In-Person   [address] [place] [address]  Language: English  Hours: Mon - Sun Open 24 Hours  Fees: Free, Self Pay   Phone: (972) 306-9502 Email: Tiago@[facility].org Website: http://www.[facility].org/     10  [facility] Distance: 17.73 miles      In-Person   [address] [place] [address]  Language: English  Hours: Mon - Fri 3:00 PM - 9:00 AM , Sat - Sun Open 24 Hours  Fees: Free   Phone: (783) 958-9099 Ext.1 Website: https://www.[facility].org/2020/07/03/emergency-family-shelter/     Shelter for individuals  11  [facility] - Homeless Resources and Housing Information - Emergency housing Distance: 5.99 miles      In-Person, Phone/Virtual   [address] [place] [address]  Language: English   Hours: Mon - Fri 8:00 AM - 4:30 PM  Fees: Free   Phone: (220) 992-2356 Email: víctorAppydrink@co.[facility]. Website: https://www.[facility]./469/Community-Services     [address] [place] [address] - [facility] - Homeless Resources and Housing Information - Emergency housing Distance: 11.1 miles      In-Person, Phone/Virtual   [address] [place] [address]  Language: English  Hours: Mon - Fri 8:00 AM - 4:30 PM  Fees: Free   Phone: (347) 144-5173 Email: zeferinonelajordi@[facility]. Website: https://www.[facility]./Facilities/Facility/Details/[facility]-22          Important Numbers & Websites       Emergency Services   911  [facility]   311  Poison Control   (335) 556-4231  Suicide Prevention Lifeline   (857) 932-9346 (TALK)  Child Abuse Hotline   (671) 291-8207 (4-A-Child)  Sexual Assault Hotline   (919) 240-5509 (HOPE)  National Runaway Safeline   (693) 308-9149 (RUNAWAY)  All-Options Talkline   (360) 929-7196  Substance Abuse Referral   (402) 924-8931 (HELP)

## 2024-01-18 NOTE — TELEPHONE ENCOUNTER
Situation:   Patient is having incredible rib pain from the cough. Pt also tested positive for COVID on 1/16/2024.    Background:   Pt thinks she had Bronchitis cough 2 weeks ago.    Assessment:   COVID symptoms started Monday. Pt tested positive on Tuesday 1/16/2024. Pt is currently on Day 3, onset of COVID symptoms. Pt does not have a fever anymore. Pt has a non-productive dry cough. Cough occurs more at night. Pt is now coughing to the point that her rib cage hurts.    Pulse Oximeter reading at [facility] is 95%.    Patient has tried taking ibruprofen, and Muncinex. Pt is NOT requesting for COVID medication (Paxlovid) but would like something for her rib pain, from coughing too much.      Recommendation:  Explained to patient that writer will recommend pt to schedule a virtual visit even though she is NOT requesting for Paxlovid. Because pt is still requesting for a medication and is on Amlodipine (requires a provider visit for patient who wants Paxlovid), pt should have a virtual visit with a provider to discuss these concerns. Pt agrees.    Assisted in scheduling virtual visit for:       Jan 18, 2024  3:00 PM  Provider Visit with Sharifa Naylor MD  [facility] ) [address] [place] [id number] [phone]          Patient verbalizes understanding, agrees with plan and has no further questions.    Closing encounter.    KEVEN GarciaN, RN   [facility]

## 2024-02-01 ENCOUNTER — OFFICE VISIT (OUTPATIENT)
Dept: FAMILY MEDICINE | Facility: CLINIC | Age: 44
End: 2024-02-01
Payer: COMMERCIAL

## 2024-02-01 VITALS
DIASTOLIC BLOOD PRESSURE: 88 MMHG | HEIGHT: 68 IN | SYSTOLIC BLOOD PRESSURE: 124 MMHG | TEMPERATURE: 97.6 F | RESPIRATION RATE: 16 BRPM | WEIGHT: 227.08 LBS | BODY MASS INDEX: 34.41 KG/M2 | OXYGEN SATURATION: 97 % | HEART RATE: 83 BPM

## 2024-02-01 DIAGNOSIS — R74.01 TRANSAMINITIS: ICD-10-CM

## 2024-02-01 DIAGNOSIS — Z00.00 ROUTINE GENERAL MEDICAL EXAMINATION AT A HEALTH CARE FACILITY: Primary | ICD-10-CM

## 2024-02-01 DIAGNOSIS — E11.9 TYPE 2 DIABETES MELLITUS WITHOUT COMPLICATION, WITHOUT LONG-TERM CURRENT USE OF INSULIN (H): ICD-10-CM

## 2024-02-01 DIAGNOSIS — Z13.21 ENCOUNTER FOR VITAMIN DEFICIENCY SCREENING: ICD-10-CM

## 2024-02-01 DIAGNOSIS — I10 ESSENTIAL HYPERTENSION: ICD-10-CM

## 2024-02-01 DIAGNOSIS — R53.83 OTHER FATIGUE: ICD-10-CM

## 2024-02-01 DIAGNOSIS — I10 HYPERTENSION, UNSPECIFIED TYPE: ICD-10-CM

## 2024-02-01 DIAGNOSIS — Z00.00 LABORATORY EXAMINATION ORDERED AS PART OF A ROUTINE GENERAL MEDICAL EXAMINATION: ICD-10-CM

## 2024-02-01 DIAGNOSIS — Z13.220 LIPID SCREENING: ICD-10-CM

## 2024-02-01 DIAGNOSIS — R19.7 DIARRHEA, UNSPECIFIED TYPE: ICD-10-CM

## 2024-02-01 LAB
ALBUMIN SERPL BCG-MCNC: 4.5 G/DL (ref 3.5–5.2)
ALP SERPL-CCNC: 71 U/L (ref 40–150)
ALT SERPL W P-5'-P-CCNC: 51 U/L (ref 0–50)
ANION GAP SERPL CALCULATED.3IONS-SCNC: 11 MMOL/L (ref 7–15)
AST SERPL W P-5'-P-CCNC: 45 U/L (ref 0–45)
BILIRUB SERPL-MCNC: 0.4 MG/DL
BUN SERPL-MCNC: 11.9 MG/DL (ref 6–20)
CALCIUM SERPL-MCNC: 9.9 MG/DL (ref 8.6–10)
CHLORIDE SERPL-SCNC: 106 MMOL/L (ref 98–107)
CHOLEST SERPL-MCNC: 159 MG/DL
CREAT SERPL-MCNC: 0.77 MG/DL (ref 0.51–0.95)
CRP SERPL-MCNC: <3 MG/L
DEPRECATED HCO3 PLAS-SCNC: 24 MMOL/L (ref 22–29)
EGFRCR SERPLBLD CKD-EPI 2021: >90 ML/MIN/1.73M2
ERYTHROCYTE [DISTWIDTH] IN BLOOD BY AUTOMATED COUNT: 12.8 % (ref 10–15)
ERYTHROCYTE [SEDIMENTATION RATE] IN BLOOD BY WESTERGREN METHOD: 10 MM/HR (ref 0–20)
FASTING STATUS PATIENT QL REPORTED: YES
GLUCOSE SERPL-MCNC: 192 MG/DL (ref 70–99)
HBA1C MFR BLD: 8.6 % (ref 0–5.6)
HCT VFR BLD AUTO: 40.9 % (ref 35–47)
HDLC SERPL-MCNC: 45 MG/DL
HGB BLD-MCNC: 14.4 G/DL (ref 11.7–15.7)
LDLC SERPL CALC-MCNC: 82 MG/DL
MCH RBC QN AUTO: 30.8 PG (ref 26.5–33)
MCHC RBC AUTO-ENTMCNC: 35.2 G/DL (ref 31.5–36.5)
MCV RBC AUTO: 88 FL (ref 78–100)
NONHDLC SERPL-MCNC: 114 MG/DL
PLATELET # BLD AUTO: 268 10E3/UL (ref 150–450)
POTASSIUM SERPL-SCNC: 4.5 MMOL/L (ref 3.4–5.3)
PROT SERPL-MCNC: 7.5 G/DL (ref 6.4–8.3)
RBC # BLD AUTO: 4.67 10E6/UL (ref 3.8–5.2)
SODIUM SERPL-SCNC: 141 MMOL/L (ref 135–145)
TRIGL SERPL-MCNC: 158 MG/DL
TSH SERPL DL<=0.005 MIU/L-ACNC: 2.32 UIU/ML (ref 0.3–4.2)
VIT D+METAB SERPL-MCNC: 26 NG/ML (ref 20–50)
WBC # BLD AUTO: 8 10E3/UL (ref 4–11)

## 2024-02-01 PROCEDURE — 90471 IMMUNIZATION ADMIN: CPT | Performed by: FAMILY MEDICINE

## 2024-02-01 PROCEDURE — 80061 LIPID PANEL: CPT | Performed by: FAMILY MEDICINE

## 2024-02-01 PROCEDURE — 83036 HEMOGLOBIN GLYCOSYLATED A1C: CPT | Performed by: FAMILY MEDICINE

## 2024-02-01 PROCEDURE — 80053 COMPREHEN METABOLIC PANEL: CPT | Performed by: FAMILY MEDICINE

## 2024-02-01 PROCEDURE — 85652 RBC SED RATE AUTOMATED: CPT | Performed by: FAMILY MEDICINE

## 2024-02-01 PROCEDURE — 36415 COLL VENOUS BLD VENIPUNCTURE: CPT | Performed by: FAMILY MEDICINE

## 2024-02-01 PROCEDURE — 82306 VITAMIN D 25 HYDROXY: CPT | Performed by: FAMILY MEDICINE

## 2024-02-01 PROCEDURE — 99214 OFFICE O/P EST MOD 30 MIN: CPT | Mod: 25 | Performed by: FAMILY MEDICINE

## 2024-02-01 PROCEDURE — 84443 ASSAY THYROID STIM HORMONE: CPT | Performed by: FAMILY MEDICINE

## 2024-02-01 PROCEDURE — 90677 PCV20 VACCINE IM: CPT | Performed by: FAMILY MEDICINE

## 2024-02-01 PROCEDURE — 85027 COMPLETE CBC AUTOMATED: CPT | Performed by: FAMILY MEDICINE

## 2024-02-01 PROCEDURE — 99396 PREV VISIT EST AGE 40-64: CPT | Mod: 25 | Performed by: FAMILY MEDICINE

## 2024-02-01 PROCEDURE — 86140 C-REACTIVE PROTEIN: CPT | Performed by: FAMILY MEDICINE

## 2024-02-01 RX ORDER — LISINOPRIL 40 MG/1
40 TABLET ORAL DAILY
Qty: 90 TABLET | Refills: 3 | Status: SHIPPED | OUTPATIENT
Start: 2024-02-01 | End: 2024-07-17

## 2024-02-01 RX ORDER — HYDROCHLOROTHIAZIDE 25 MG/1
25 TABLET ORAL DAILY
Qty: 90 TABLET | Refills: 3 | Status: SHIPPED | OUTPATIENT
Start: 2024-02-01

## 2024-02-01 RX ORDER — ATORVASTATIN CALCIUM 20 MG/1
20 TABLET, FILM COATED ORAL DAILY
Qty: 90 TABLET | Refills: 3 | Status: SHIPPED | OUTPATIENT
Start: 2024-02-01

## 2024-02-01 SDOH — HEALTH STABILITY: PHYSICAL HEALTH: ON AVERAGE, HOW MANY DAYS PER WEEK DO YOU ENGAGE IN MODERATE TO STRENUOUS EXERCISE (LIKE A BRISK WALK)?: 1 DAY

## 2024-02-01 ASSESSMENT — SOCIAL DETERMINANTS OF HEALTH (SDOH): HOW OFTEN DO YOU GET TOGETHER WITH FRIENDS OR RELATIVES?: ONCE A WEEK

## 2024-02-01 NOTE — PATIENT INSTRUCTIONS
Your Health Risk Assessment indicates you feel you are not in good health    A healthy lifestyle helps keep the body fit and the mind alert. It helps protect you from disease, helps you fight disease, and helps prevent chronic disease (disease that doesn't go away) from getting worse. This is important as you get older and begin to notice twinges in muscles and joints and a decline in the strength and stamina you once took for granted. A healthy lifestyle includes good healthcare, good nutrition, weight control, recreation, and regular exercise. Avoid harmful substances and do what you can to keep safe. Another part of a healthy lifestyle is stay mentally active and socially involved.    Good healthcare   Have a wellness visit every year.   If you have new symptoms, let us know right away. Don't wait until the next checkup.   Take medicines exactly as prescribed and keep your medicines in a safe place. Tell us if your medicine causes problems.   Healthy diet and weight control   Eat 3 or 4 small, nutritious, low-fat, high-fiber meals a day. Include a variety of fruits, vegetables, and whole-grain foods.   Make sure you get enough calcium in your diet. Calcium, vitamin D, and exercise help prevent osteoporosis (bone thinning).   If you live alone, try eating with others when you can. That way you get a good meal and have company while you eat it.   Try to keep a healthy weight. If you eat more calories than your body uses for energy, it will be stored as fat and you will gain weight.     Recreation   Recreation is not limited to sports and team events. It includes any activity that provides relaxation, interest, enjoyment, and exercise. Recreation provides an outlet for physical, mental, and social energy. It can give a sense of worth and achievement. It can help you stay healthy.    Mental Exercise and Social Involvement  Mental and emotional health is as important as physical health. Keep in touch with friends and  family. Stay as active as possible. Continue to learn and challenge yourself.   Things you can do to stay mentally active are:  Learn something new, like a foreign language or musical instrument.   Play SCRABBLE or do crossword puzzles. If you cannot find people to play these games with you at home, you can play them with others on your computer through the Internet.   Join a games club--anything from card games to chess or checkers or lawn bowling.   Start a new hobby.   Go back to school.   Volunteer.   Read.   Keep up with world events.  Eating Healthy Foods: Care Instructions  With every meal, you can make healthy food choices. Try to eat a variety of fruits, vegetables, whole grains, lean proteins, and low-fat dairy products. This can help you get the right balance of nutrients, including vitamins and minerals. Small changes add up over time. You can start by adding one healthy food to your meals each day.    Try to make half your plate fruits and vegetables, one-fourth whole grains, and one-fourth lean proteins. Try including dairy with your meals.   Eat more fruits and vegetables. Try to have them with most meals and snacks.   Foods for healthy eating    Fruits    These can be fresh, frozen, canned, or dried.  Try to choose whole fruit rather than fruit juice.  Eat a variety of colors.    Vegetables    These can be fresh, frozen, canned, or dried.  Beans, peas, and lentils count too.    Whole grains    Choose whole-grain breads, cereals, and noodles.  Try brown rice.    Lean proteins    These can include lean meat, poultry, fish, and eggs.  You can also have tofu, beans, peas, lentils, nuts, and seeds.    Dairy    Try milk, yogurt, and cheese.  Choose low-fat or fat-free when you can.  If you need to, use lactose-free milk or fortified plant-based milk products, such as soy milk.    Water    Drink water when you're thirsty.  Limit sugar-sweetened drinks, including soda, fruit drinks, and sports drinks.  Where  "can you learn more?  Go to https://www.zEconomy.net/patiented  Enter T756 in the search box to learn more about \"Eating Healthy Foods: Care Instructions.\"  Current as of: February 28, 2023               Content Version: 13.8    [id number] Isabella Oliver.   Care instructions adapted under license by your healthcare professional. If you have questions about a medical condition or this instruction, always ask your healthcare professional. [employer] disclaims any warranty or liability for your use of this information.      Learning About Stress  What is stress?     Stress is your body's response to a hard situation. Your body can have a physical, emotional, or mental response. Stress is a fact of life for most people, and it affects everyone differently. What causes stress for you may not be stressful for someone else.  A lot of things can cause stress. You may feel stress when you go on a job interview, take a test, or run a race. This kind of short-term stress is normal and even useful. It can help you if you need to work hard or react quickly. For example, stress can help you finish an important job on time.  Long-term stress is caused by ongoing stressful situations or events. Examples of long-term stress include long-term health problems, ongoing problems at work, or conflicts in your family. Long-term stress can harm your health.  How does stress affect your health?  When you are stressed, your body responds as though you are in danger. It makes hormones that speed up your heart, make you breathe faster, and give you a burst of energy. This is called the fight-or-flight stress response. If the stress is over quickly, your body goes back to normal and no harm is done.  But if stress happens too often or lasts too long, it can have bad effects. Long-term stress can make you more likely to get sick, and it can make symptoms of some diseases worse. If you tense up when you are stressed, you " may develop neck, shoulder, or low back pain. Stress is linked to high blood pressure and heart disease.  Stress also harms your emotional health. It can make you villalobos, tense, or depressed. Your relationships may suffer, and you may not do well at work or school.  What can you do to manage stress?  You can try these things to help manage stress:   Do something active. Exercise or activity can help reduce stress. Walking is a great way to get started. Even everyday activities such as housecleaning or yard work can help.  Try yoga or rupa chi. These techniques combine exercise and meditation. You may need some training at first to learn them.  Do something you enjoy. For example, listen to music or go to a movie. Practice your hobby or do volunteer work.  Meditate. This can help you relax, because you are not worrying about what happened before or what may happen in the future.  Do guided imagery. Imagine yourself in any setting that helps you feel calm. You can use online videos, books, or a teacher to guide you.  Do breathing exercises. For example:  From a standing position, bend forward from the waist with your knees slightly bent. Let your arms dangle close to the floor.  Breathe in slowly and deeply as you return to a standing position. Roll up slowly and lift your head last.  Hold your breath for just a few seconds in the standing position.  Breathe out slowly and bend forward from the waist.  Let your feelings out. Talk, laugh, cry, and express anger when you need to. Talking with supportive friends or family, a counselor, or a sarah leader about your feelings is a healthy way to relieve stress. Avoid discussing your feelings with people who make you feel worse.  Write. It may help to write about things that are bothering you. This helps you find out how much stress you feel and what is causing it. When you know this, you can find better ways to cope.  What can you do to prevent stress?  You might try some of  "these things to help prevent stress:  Manage your time. This helps you find time to do the things you want and need to do.  Get enough sleep. Your body recovers from the stresses of the day while you are sleeping.  Get support. Your family, friends, and community can make a difference in how you experience stress.  Limit your news feed. Avoid or limit time on social media or news that may make you feel stressed.  Do something active. Exercise or activity can help reduce stress. Walking is a great way to get started.  Where can you learn more?  Go to https://www.[facility].net/patiented  Enter N032 in the search box to learn more about \"Learning About Stress.\"  Current as of: February 26, 2023               Content Version: 13.8    [id number] [employer].   Care instructions adapted under license by your healthcare professional. If you have questions about a medical condition or this instruction, always ask your healthcare professional. [employer] disclaims any warranty or liability for your use of this information.      Preventive Care Advice   This is general advice given by our system to help you stay healthy. However, your care team may have specific advice just for you. Please talk to your care team about your preventive care needs.  Nutrition  Eat 5 or more servings of fruits and vegetables each day.  Try wheat bread, brown rice and whole grain pasta (instead of white bread, rice, and pasta).  Get enough calcium and vitamin D. Check the label on foods and aim for 100% of the RDA (recommended daily allowance).  Lifestyle  Exercise at least 150 minutes each week  (30 minutes a day, 5 days a week).  Do muscle strengthening activities 2 days a week. These help control your weight and prevent disease.  No smoking.  Wear sunscreen to prevent skin cancer.  Have a dental exam and cleaning every 6 months.  Yearly exams  See your health care team every year to talk about:  Any changes in your " health.  Any medicines your care team has prescribed.  Preventive care, family planning, and ways to prevent chronic diseases.  Shots (vaccines)   HPV shots (up to age 26), if you've never had them before.  Hepatitis B shots (up to age 59), if you've never had them before.  COVID-19 shot: Get this shot when it's due.  Flu shot: Get a flu shot every year.  Tetanus shot: Get a tetanus shot every 10 years.  Pneumococcal, hepatitis A, and RSV shots: Ask your care team if you need these based on your risk.  Shingles shot (for age 50 and up)  General health tests  Diabetes screening:  Starting at age 35, Get screened for diabetes at least every 3 years.  If you are younger than age 35, ask your care team if you should be screened for diabetes.  Cholesterol test: At age 39, start having a cholesterol test every 5 years, or more often if advised.  Bone density scan (DEXA): At age 50, ask your care team if you should have this scan for osteoporosis (brittle bones).  Hepatitis C: Get tested at least once in your life.  STIs (sexually transmitted infections)  Before age 24: Ask your care team if you should be screened for STIs.  After age 24: Get screened for STIs if you're at risk. You are at risk for STIs (including HIV) if:  You are sexually active with more than one person.  You don't use condoms every time.  You or a partner was diagnosed with a sexually transmitted infection.  If you are at risk for HIV, ask about PrEP medicine to prevent HIV.  Get tested for HIV at least once in your life, whether you are at risk for HIV or not.  Cancer screening tests  Cervical cancer screening: If you have a cervix, begin getting regular cervical cancer screening tests starting at age 21.  Breast cancer scan (mammogram): If you've ever had breasts, begin having regular mammograms starting at age 40. This is a scan to check for breast cancer.  Colon cancer screening: It is important to start screening for colon cancer at age 45.  Have  a colonoscopy test every 10 years (or more often if you're at risk) Or, ask your provider about stool tests like a FIT test every year or Cologuard test every 3 years.  To learn more about your testing options, visit:   https://www.Learnerator/[email or web address].  For help making a decision, visit:   https://bit.CopaCast/cd91521.  Prostate cancer screening test: If you have a prostate, ask your care team if a prostate cancer screening test (PSA) at age 55 is right for you.  Lung cancer screening: If you are a current or former smoker ages 50 to 80, ask your care team if ongoing lung cancer screenings are right for you.  For informational purposes only. Not to replace the advice of your health care provider. Copyright   2023 [employer]. All rights reserved. Clinically reviewed by the [facility]itions Program. [employer] 309132 - REV 01/24.

## 2024-02-01 NOTE — PROGRESS NOTES
Preventive Care Visit  [facility] RUTHIE Naylor MD, Family Medicine  Feb 1, 2024      Claire was seen today for physical.    Diagnoses and all orders for this visit:    Routine general medical examination at a health care facility  -     Adult Dermatology  Referral; Future- patient request for dermatology referral for general skin check    Type 2 diabetes mellitus without complication, without long-term current use of insulin (H): A1C not at goal- did not tolerate metformin due to brain fog. Reviewed medication options with patient- she will check with insurance and let me know. Reviewed recommendation for moderate intensity statin based on age >40 and diabetes diagnosis. She will schedule her annual eye exam.   -     Hemoglobin A1c  -     Hemoglobin A1c; Future  -     atorvastatin (LIPITOR) 20 MG tablet; Take 1 tablet (20 mg) by mouth daily    Lipid screening  -     Lipid panel reflex to direct LDL Fasting    Laboratory examination ordered as part of a routine general medical examination  -     CBC with platelets    Transaminitis: Check labs. Continue lifestyle modifications.  -     Comprehensive metabolic panel (BMP + Alb, Alk Phos, ALT, AST, Total. Bili, TP)    Other fatigue: Likely was post-covid, but will check baseline labs.  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 Continue lisinopril and amlodipine.  -     hydrochlorothiazide (HYDRODIURIL) 25 MG tablet; Take 1 tablet (25 mg) by mouth daily  -     lisinopril (ZESTRIL) 40 MG tablet; Take 1 tablet (40 mg) by mouth daily    Diarrhea, unspecified type: Chronic, non-bloody. No red flag symptoms. Check inflammatory markers. Continue high  fiber diet. Consider stool studies, colonoscopy/GI referral if no improvement.  -     ESR: Erythrocyte sedimentation rate; Future  -     CRP, inflammation; Future  -     CRP, inflammation  -     ESR: Erythrocyte sedimentation rate    Other orders  -     REVIEW OF HEALTH MAINTENANCE PROTOCOL ORDERS  -     Pneumococcal 20 Valent Conjugate (Prevnar 20)  -     PRIMARY CARE FOLLOW-UP SCHEDULING; Future  -     PRIMARY CARE FOLLOW-UP SCHEDULING; Future      Subjective   Claire is a 43 year old, presenting for the following:  Physical        2/1/2024     8:30 AM   Additional Questions   Roomed by Martha LOZADA MA   Accompanied by SELF          HPI    Checked and trulicity is covered by $150 per month  Did not tolerate metformin due to significant brain fog- despite slow taper    Diarrhea- >1 year duration  Non-bloody  No severe abdominal pain  5-10 times per day  Usually shortly after eating    HTN- takes lisinopril, hydrochlorothiazide and amlodipine- was out of hydrochlorothiazide for the last 5 days    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 (!) FAIR   Feel stress (tense, anxious, or unable to sleep) Only a little   (!) STRESS CONCERN      2/1/2024   Nutrition   Three or more servings of calcium each day? (!) NO   Diet: Regular (no restrictions)   How many servings of fruit and vegetables per day? (!) 0-1   How many sweetened beverages each day? 0-1         2/1/2024   Exercise   Days per week of moderate/strenous exercise 1 day   (!) EXERCISE CONCERN      2/1/2024   Social Factors   Frequency of gathering with friends or relatives Once a week   Worry food won't last until get money to buy more No   Food not last or not have  enough money for food? No   Do you have housing?  Yes   Are you worried about losing your housing? No   Lack of transportation? No   Unable to get utilities (heat,electricity)? No         2/1/2024   Dental   Dentist two times every year? Yes         2/1/2024   TB Screening   Were you born outside of [place]?  No         Today's PHQ-9 Score:        No data to display                  2/1/2024   Substance Use   Alcohol more than 3/day or more than 7/wk No   Do you use any other substances recreationally? No     Social History     Tobacco Use    Smoking status: Never     Passive exposure: Never    Smokeless tobacco: Never   Vaping Use    Vaping Use: Never used   Substance Use Topics    Alcohol use: Yes     Comment: Alcoholic Drinks/day: Seldom    Drug use: No            No data to display                   2/1/2024   STI Screening   New sexual partner(s) since last STI/HIV test? No     History of abnormal Pap smear: yes        Latest Ref Rng & Units 1/4/2023     3:03 PM 11/11/2021     7:59 AM 7/24/2015     9:49 AM   PAP / HPV   PAP  Negative for Intraepithelial Lesion or Malignancy (NILM)  Negative for Intraepithelial Lesion or Malignancy (NILM)  Negative for squamous intraepithelial lesion or malignancy  Electronically signed by Chyna Brewer CT (ASCP) on 7/30/2015 at  2:22 PM      HPV 16 DNA Negative Negative  Negative     HPV 18 DNA Negative Negative  Negative     Other HR HPV Negative Negative  Negative       The 10-year ASCVD risk score (Ami CARREON, et al., 2019) is: 1.7%    Values used to calculate the score:      Age: 43 years      Sex: Female      Is Non- : No      Diabetic: Yes      Tobacco smoker: No      Systolic Blood Pressure: 124 mmHg      Is BP treated: Yes      HDL Cholesterol: 42 mg/dL      Total Cholesterol: 164 mg/dL        2/1/2024   Contraception/Family Planning   Questions about contraception or family planning No        Reviewed and updated as needed this visit by Provider    "Tobacco  Allergies  Meds  Problems  Med Hx  Surg Hx  Fam Hx              Review of Systems       Objective    Exam  /88   Pulse 83   Temp 97.6  F (36.4  C) (Temporal)   Resp 16   Ht 1.734 m (5' 8.25\")   Wt 103 kg (227 lb 1.3 oz)   LMP 01/08/2024 (Exact Date)   SpO2 97%   BMI 34.27 kg/m     Estimated body mass index is 34.11 kg/m  as calculated from the following:    Height as of 1/4/23: 1.734 m (5' 8.25\").    Weight as of 1/4/23: 102.5 kg (226 lb).  Wt Readings from Last 3 Encounters:   02/01/24 103 kg (227 lb 1.3 oz)   01/04/23 102.5 kg (226 lb)   10/20/22 102.5 kg (226 lb)       Physical Exam  General: Alert and oriented, in NAD.  Eyes: PERRL, EOMI, sclera normal.  HENT: Normocephalic, no pharyngeal erythema, MMM.  Neck: Supple, no adenopathy.  Heart: Normal S1 and S2, regular rhythm. No murmurs, gallops, rubs.  Lungs: CTA bilaterally, no wheezes, no crackles, no rhonchi.  Psych: Normal mood and affect.        Signed Electronically by: Sharifa Naylor MD    "

## 2024-02-04 DIAGNOSIS — U07.1 INFECTION DUE TO 2019 NOVEL CORONAVIRUS: ICD-10-CM

## 2024-02-05 RX ORDER — ALBUTEROL SULFATE 90 UG/1
AEROSOL, METERED RESPIRATORY (INHALATION)
Qty: 8.5 G | Refills: 0 | Status: SHIPPED | OUTPATIENT
Start: 2024-02-05

## 2024-02-29 ENCOUNTER — TRANSFERRED RECORDS (OUTPATIENT)
Dept: HEALTH INFORMATION MANAGEMENT | Facility: CLINIC | Age: 44
End: 2024-02-29
Payer: COMMERCIAL

## 2024-02-29 LAB — RETINOPATHY: NEGATIVE

## 2024-04-27 ENCOUNTER — ANCILLARY PROCEDURE (OUTPATIENT)
Dept: GENERAL RADIOLOGY | Facility: CLINIC | Age: 44
End: 2024-04-27
Attending: STUDENT IN AN ORGANIZED HEALTH CARE EDUCATION/TRAINING PROGRAM
Payer: COMMERCIAL

## 2024-04-27 ENCOUNTER — OFFICE VISIT (OUTPATIENT)
Dept: FAMILY MEDICINE | Facility: CLINIC | Age: 44
End: 2024-04-27
Payer: COMMERCIAL

## 2024-04-27 VITALS
DIASTOLIC BLOOD PRESSURE: 90 MMHG | TEMPERATURE: 97.6 F | WEIGHT: 230 LBS | OXYGEN SATURATION: 96 % | SYSTOLIC BLOOD PRESSURE: 134 MMHG | RESPIRATION RATE: 18 BRPM | BODY MASS INDEX: 34.72 KG/M2 | HEART RATE: 100 BPM

## 2024-04-27 DIAGNOSIS — R05.8 PRODUCTIVE COUGH: Primary | ICD-10-CM

## 2024-04-27 DIAGNOSIS — R05.8 PRODUCTIVE COUGH: ICD-10-CM

## 2024-04-27 DIAGNOSIS — J06.9 VIRAL URI WITH COUGH: ICD-10-CM

## 2024-04-27 PROCEDURE — 99213 OFFICE O/P EST LOW 20 MIN: CPT | Performed by: STUDENT IN AN ORGANIZED HEALTH CARE EDUCATION/TRAINING PROGRAM

## 2024-04-27 PROCEDURE — 71046 X-RAY EXAM CHEST 2 VIEWS: CPT | Mod: TC | Performed by: RADIOLOGY

## 2024-04-27 RX ORDER — BENZONATATE 100 MG/1
100 CAPSULE ORAL 3 TIMES DAILY PRN
Qty: 30 CAPSULE | Refills: 0 | Status: SHIPPED | OUTPATIENT
Start: 2024-04-27

## 2024-04-27 RX ORDER — PREDNISONE 20 MG/1
40 TABLET ORAL DAILY
Qty: 10 TABLET | Refills: 0 | Status: SHIPPED | OUTPATIENT
Start: 2024-04-27 | End: 2024-05-02

## 2024-04-27 NOTE — PATIENT INSTRUCTIONS
Cepacol lozenges  Chloraseptic spray     Left message for pt regarding her handicap placard, to  when pt is available

## 2024-04-27 NOTE — PROGRESS NOTES
Assessment & Plan     Viral URI with cough  Productive cough  Discussed in detail differentials and further management. Symptoms are likely secondary to viral infection. CXR initial interpretation is negative at this time awaiting the radiologist read. Patient has been experiencing cough symptoms for close to 4 months and will plan for trial of prednisone for 5 days along with benzonatate for additional symptomatic relief. Patient had COVID in January of 2024 and thus could be experiencing symptoms of Long COVID. Recommended well hydration, over-the-counter analgesia, warm fluids and saline gargles. Follow up if symptoms persist or worsen. Written instructions/information provided. Patient understood and in agreement with the above plan. All questions are answered.     - XR Chest 2 Views  - predniSONE (DELTASONE) 20 MG tablet  Dispense: 10 tablet; Refill: 0  - benzonatate (TESSALON) 100 MG capsule  Dispense: 30 capsule; Refill: 0     19 minutes spent by me on the date of the encounter doing chart review, history and exam, documentation and further activities per the note. Billed based on complexity of care.     No follow-ups on file.    Savanah Gonzalez MD  [facility]    Milady Rangel is a 43 year old female who presents to clinic today for the following health issues:  Chief Complaint   Patient presents with    Cough     Cough since January. Cough is consistent body hurts sick since Sunday. Negative home covid. Fever off and on.     HPI    Has been sick again since Sunday, not getting any better. Throwing up from the coughing, gagging, can't sleep. Has been using an inhaler. Peeing herself since she is coughing. Using Mucinex and Sudafed. Reports lightheadedness/dizziness. Reports chest pain. Abdominal pain with taking meds if she hasn't eaten. Lower back pain with body aching.     URI Adult    Onset of symptoms was 3-4 month(s) ago.  Course of illness is waxing and  waning.    Severity moderate  Current and Associated symptoms: fever, chills, sweats, runny nose, stuffy nose, cough - productive, shortness of breath, ear pain both, hoarse voice, headache, body aches, fatigue, and vomiting  Treatment measures tried include Mucinex, Sudafed, vitamin C, hot showers.  Predisposing factors include None.    Review of Systems  Constitutional, HEENT, cardiovascular, pulmonary, gi and gu systems are negative, except as otherwise noted.      Objective    BP (!) 134/90   Pulse 100   Temp 97.6  F (36.4  C)   Resp 18   Wt 104.3 kg (230 lb)   LMP 03/12/2024   SpO2 96%   BMI 34.72 kg/m    Physical Exam   GENERAL: alert and no distress  EYES: Eyes grossly normal to inspection, PERRL and conjunctivae and sclerae normal  HENT: normal cephalic/atraumatic, ear canals and TM's normal, nose and mouth without ulcers or lesions, nasal mucosa edematous , rhinorrhea clear, white, and watery, oropharynx clear, oral mucous membranes moist, and sinuses: not tender, maxillary swelling on left  NECK: no adenopathy, no asymmetry, masses, or scars  RESP: lungs clear to auscultation - no rales, rhonchi or wheezes  CV: regular rate and rhythm, normal S1 S2, no S3 or S4, no murmur, click or rub, no peripheral edema  MS: no gross musculoskeletal defects noted, no edema  SKIN: no suspicious lesions or rashes    CXR - Reviewed and interpreted by me Normal- no infiltrates, effusions, pneumothoraces, cardiomegaly or masses

## 2024-05-11 ENCOUNTER — HEALTH MAINTENANCE LETTER (OUTPATIENT)
Age: 44
End: 2024-05-11

## 2024-07-17 DIAGNOSIS — I10 HYPERTENSION, UNSPECIFIED TYPE: ICD-10-CM

## 2024-07-17 RX ORDER — LISINOPRIL 40 MG/1
40 TABLET ORAL DAILY
Qty: 90 TABLET | Refills: 3 | Status: SHIPPED | OUTPATIENT
Start: 2024-07-17

## 2024-07-17 RX ORDER — LISINOPRIL 40 MG/1
40 TABLET ORAL DAILY
Qty: 90 TABLET | Refills: 3 | Status: CANCELLED | OUTPATIENT
Start: 2024-07-17

## 2024-07-17 NOTE — TELEPHONE ENCOUNTER
Medication Question or Refill    Contacts       Contact Date/Time Type Contact Phone/Fax    07/17/2024 07:37 AM CDT Phone (Incoming) Claire De La Cruz (Self) [phone] (M)            What medication are you calling about (include dose and sig)?:     lisinopril (ZESTRIL) 40 MG tablet       Preferred Pharmacy:   [id number] [place] [address] [address] [address]  Phone: [phone] Fax: [phone]      Controlled Substance Agreement on file:   CSA -- Patient Level:    CSA: None found at the patient level.       Who prescribed the medication?: PCP    Do you need a refill? Yes    When did you use the medication last? 07/17/2024    Patient offered an appointment? No    Do you have any questions or concerns?  Yes: Pt has changed Pharmacies. Cherri transferred Rx to [facility], but [facility] never received it. Cherri advised Pt to reach out to PCP to send a new Rx to [facility]. Pt is leaving on Vacation today, Pt asking if PCP could please refill this am.      Could we [facility] this [facility] you [facility] or would you prefer to receive a phone call?:   Patient would prefer a phone call   Okay to leave a detailed message?: Yes at Home number on file [phone] (home)

## 2024-08-01 ENCOUNTER — TRANSFERRED RECORDS (OUTPATIENT)
Dept: MULTI SPECIALTY CLINIC | Facility: CLINIC | Age: 44
End: 2024-08-01

## 2024-08-01 LAB — RETINOPATHY: NORMAL

## 2024-08-08 ENCOUNTER — TELEPHONE (OUTPATIENT)
Dept: FAMILY MEDICINE | Facility: CLINIC | Age: 44
End: 2024-08-08
Payer: COMMERCIAL

## 2024-08-08 NOTE — TELEPHONE ENCOUNTER
Patient Quality Outreach    Patient is due for the following:   Hypertension -  Hypertension follow-up visit    Next Steps:   Schedule a office visit for HTN     Type of outreach:    Sent [employer] message.    Next Steps:  Reach out within 90 days via [employer].    Max number of attempts reached: Yes. Will try again in 90 days if patient still on fail list.    Questions for provider review:    None           Marcello Norwood MA  Chart routed to Care Team.

## 2024-08-20 ENCOUNTER — OFFICE VISIT (OUTPATIENT)
Dept: DERMATOLOGY | Facility: CLINIC | Age: 44
End: 2024-08-20
Attending: FAMILY MEDICINE
Payer: COMMERCIAL

## 2024-08-20 DIAGNOSIS — L73.8 SEBACEOUS HYPERPLASIA: Primary | ICD-10-CM

## 2024-08-20 DIAGNOSIS — D18.01 CHERRY ANGIOMA: ICD-10-CM

## 2024-08-20 DIAGNOSIS — L82.1 SEBORRHEIC KERATOSES: ICD-10-CM

## 2024-08-20 DIAGNOSIS — Z12.83 ENCOUNTER FOR SCREENING FOR MALIGNANT NEOPLASM OF SKIN: ICD-10-CM

## 2024-08-20 DIAGNOSIS — L72.0 MILIA: ICD-10-CM

## 2024-08-20 DIAGNOSIS — L81.4 LENTIGINES: ICD-10-CM

## 2024-08-20 DIAGNOSIS — D22.9 MULTIPLE NEVI: ICD-10-CM

## 2024-08-20 PROCEDURE — 99204 OFFICE O/P NEW MOD 45 MIN: CPT | Performed by: PHYSICIAN ASSISTANT

## 2024-08-20 RX ORDER — TRETINOIN 0.5 MG/G
CREAM TOPICAL AT BEDTIME
Qty: 45 G | Refills: 4 | Status: SHIPPED | OUTPATIENT
Start: 2024-08-20

## 2024-08-20 ASSESSMENT — PAIN SCALES - GENERAL: PAINLEVEL: NO PAIN (0)

## 2024-08-20 NOTE — NURSING NOTE
Dermatology Rooming Note    Claire De La Cruz's goals for this visit include:   Chief Complaint   Patient presents with    Skin Check     Claire is here today for a skin check. She is concerned about moles on her lower back and abdomen. She is also concerned about bumps on her face.     Naman NOBLE CMA

## 2024-08-20 NOTE — PROGRESS NOTES
"[facility] Dermatology Note  Encounter Date: Aug 20, 2024  Office Visit     Reviewed patients past medical history and pertinent chart review prior to patients visit today.     Dermatology Problem List:  # Sebaceous hyperplasia  # Milia  - tretinoin 0.05% cream    No personal history of skin cancer.  No family history of skin cancer.  ____________________________________________    Assessment & Plan:     # Milia  # Sebaceous hyperplasia  - We reviewed the benign etiology of this skin finding.   - Tretinoin [Retin A] 0.05% cream was prescribed today. A \"pea\" sized amount should be applied to the entire face nightly. Initially, the cream should be used every 3rd night for two weeks, then every other night for two weeks, then nightly as tolerated to limit irritation. Moisturization after application of the tretinoin cream will help with discomfort should irritation develop.    # Multiple nevi, trunk and extremities  # Solar lentigines  - No concerning features on dermoscopy. We discussed the importance of self exams at home. ABCDE criteria and importance of photoprotection reviewed.     # Cherry angiomas  # Seborrheic keratoses  - The patient's lesions of concern involving the right hip and right lower back are consistent with seborrheic keratoses.   - We discussed the benign nature of the skin lesions. No treatment required. Continued observation recommended. Follow up with any concerns.      Follow-up:  Annual for follow up full body skin exam, as needed for new or changing lesions or new concerns    All risks, benefits and alternatives were discussed with patient.  Patient is in agreement and understands the assessment and plan.  All questions were answered.  Heike Thrasher PA-C  [facility] Dermatology    ____________________________________________    CC: Skin Check (Claire is here today for a skin check. She is concerned about moles on her lower back and abdomen. She is also concerned " about bumps on her face.)    HPI:  Ms. Claire De La Cruz is a(n) 43 year old female who presents today as a new patient for a full body skin cancer screening. The patient requests evaluation of lesions on the right hip and right lower back. The lesions are longstanding, but have become more raised over time. Second, she reports bumps on her face that have increased in number over time. No pain, itching, or bleeding at the site. No other specific cutaneous concerns today. The patient reports trying to be diligent with photoprotection.      Physical Exam:  Vitals: There were no vitals taken for this visit.  SKIN: Total skin excluding the genitalia areas was performed. The exam included the scalp, face, neck, bilateral arms, chest, back, abdomen, bilateral legs, digits, mons pubis, buttocks, and nails.   - Posey II.  - Scattered on the face are yellow papules with coronal telangiectasias, consistent with sebaceous hyperplasia.   - Few <1 mm white papules on the bilateral upper cheeks, consistent with milia.   - Multiple tan/brown macules and papules scattered throughout exam, consistent with benign nevi. No concerning features on dermoscopy.   - Scattered tan, homogenous macules scattered on sun exposed skin, consistent with solar lentigines.   - Scattered waxy, stuck on appearing papules and patches, consistent with seborrheic keratoses.    - Several 1-2 mm red dome shaped symmetric papules, consistent with cherry angiomas.     Medications:  Current Outpatient Medications   Medication Sig Dispense Refill    amLODIPine (NORVASC) 5 MG tablet TAKE 1 TABLET(5 MG) BY MOUTH DAILY 90 tablet 3    hydrochlorothiazide (HYDRODIURIL) 25 MG tablet Take 1 tablet (25 mg) by mouth daily 90 tablet 3    lisinopril (ZESTRIL) 40 MG tablet Take 1 tablet (40 mg) by mouth daily 90 tablet 3    albuterol (PROAIR HFA/PROVENTIL HFA/VENTOLIN HFA) 108 (90 Base) MCG/ACT inhaler INHALE 2 PUFFS BY MOUTH EVERY 6 HOURS AS NEEDED FOR SHORTNESS OF  BREATH, WHEEZING, OR COUGHING (Patient not taking: Reported on 8/20/2024) 8.5 g 0    atorvastatin (LIPITOR) 20 MG tablet Take 1 tablet (20 mg) by mouth daily (Patient not taking: Reported on 8/20/2024) 90 tablet 3    benzonatate (TESSALON) 100 MG capsule Take 1 capsule (100 mg) by mouth 3 times daily as needed for cough (Patient not taking: Reported on 8/20/2024) 30 capsule 0     No current facility-administered medications for this visit.      Past Medical History:   Patient Active Problem List   Diagnosis    Methylenetetrahydrofolate Reductase Deficiency    Vitamin D Deficiency    Hypertension    Menorrhagia with regular cycle    JE II (cervical intraepithelial neoplasia II)    Diabetes mellitus, type 2 (H)    Hepatic steatosis     Past Medical History:   Diagnosis Date    Abnormal Pap smear of cervix     JE II (cervical intraepithelial neoplasia II) 11/14/2008    10/14/08 ASCUS with positive HPV 11/14/08 Colpo JE II 12/3/08 LEEP mild squamous atypia, ECC neg 4/22/11 NIL 7/25/15 NIL pap, neg HPV 11/11/21 NIL pap, neg HPV. Plan: cotest in 1 year    Hypertension     Postpartum hemorrhage 2010       CC Sharifa Naylor MD  [address] [place] [phone] on close of this encounter.

## 2024-08-20 NOTE — PATIENT INSTRUCTIONS
Patient Education        Proper skin care from [facility]:     -Eliminate harsh soaps as they strip the natural oils from the skin, often resulting in dry itchy skin ( i.e. Dial, Zest, [place] Spring)  -Use mild soaps such as Cetaphil or Dove Sensitive Skin in the shower. You do not need to use soap on arms, legs, and trunk every time you shower unless visibly soiled.   -Avoid hot or cold showers.  -After showering, lightly dry off and apply moisturizing within 2-3 minutes. This will help trap moisture in the skin.   -Aggressive use of a moisturizer at least 1-2 times a day to the entire body (including -Vanicream, Cetaphil, Aquaphor or Cerave) and moisturize hands after every washing.  -We recommend using moisturizers that come in a tub that needs to be scooped out, not a pump. This has more of an oil base. It will hold moisture in your skin much better than a water base moisturizer. The above recommended are non-pore clogging.        Wear a sunscreen with at least SPF 30 on your face, ears, neck and V of the chest daily. Wear sunscreen on other areas of the body if those areas are exposed to the sun throughout the day. Sunscreens can contain physical and/or chemical blockers. Physical blockers are less likely to clog pores, these include zinc oxide and titanium dioxide. Reapply every two hour and after swimming.      Sunscreen examples: https://www.ewg.org/sunscreen/     UV radiation  UVA radiation remains constant throughout the day and throughout the year. It is a longer wavelength than UVB and therefore penetrates deeper into the skin leading to immediate and delayed tanning, photoaging, and skin cancer. 70-80% of UVA and UVB radiation occurs between the hours of 10am-2pm.  UVB radiation  UVB radiation causes the most harmful effects and is more significant during the summer months. However, snow and ice can reflect UVB radiation leading to skin damage during the winter months as well. UVB radiation is  responsible for tanning, burning, inflammation, delayed erythema (pinkness), pigmentation (brown spots), and skin cancer.      I recommend self monthly full body exams and yearly full body exams with a dermatology provider. If you develop a new or changing lesion please follow up for examination. Most skin cancers are pink and scaly or pink and pearly. However, we do see blue/brown/black skin cancers.  Consider the ABCDEs of melanoma when giving yourself your monthly full body exam ( don't forget the groin, buttocks, feet, toes, etc). A-asymmetry, B-borders, C-color, D-diameter, E-elevation or evolving. If you see any of these changes please follow up in clinic. If you cannot see your back I recommend purchasing a hand held mirror to use with a larger wall mirror.       Checking for Skin Cancer  You can find cancer early by checking your skin each month. There are 3 kinds of skin cancer. They are melanoma, basal cell carcinoma, and squamous cell carcinoma. Doing monthly skin checks is the best way to find new marks or skin changes. Follow the instructions below for checking your skin.   The ABCDEs of checking moles for melanoma   Check your moles or growths for signs of melanoma using ABCDE:   Asymmetry: the sides of the mole or growth don t match  Border: the edges are ragged, notched, or blurred  Color: the color within the mole or growth varies  Diameter: the mole or growth is larger than 6 mm (size of a pencil eraser)  Evolving: the size, shape, or color of the mole or growth is changing (evolving is not shown in the images below)    Checking for other types of skin cancer  Basal cell carcinoma or squamous cell carcinoma have symptoms such as:      A spot or mole that looks different from all other marks on your skin  Changes in how an area feels, such as itching, tenderness, or pain  Changes in the skin's surface, such as oozing, bleeding, or scaliness  A sore that does not heal  New swelling or redness beyond  the border of a mole     Who s at risk?  Anyone can get skin cancer. But you are at greater risk if you have:   Fair skin, light-colored hair, or light-colored eyes  Many moles or abnormal moles on your skin  A history of sunburns from sunlight or tanning beds  A family history of skin cancer  A history of exposure to radiation or chemicals  A weakened immune system  If you have had skin cancer in the past, you are at risk for recurring skin cancer.   How to check your skin  Do your monthly skin checkups in front of a full-length mirror. Check all parts of your body, including your:   Head (ears, face, neck, and scalp)  Torso (front, back, and sides)  Arms (tops, undersides, upper, and lower armpits)  Hands (palms, backs, and fingers, including under the nails)  Buttocks and genitals  Legs (front, back, and sides)  Feet (tops, soles, toes, including under the nails, and between toes)  If you have a lot of moles, take digital photos of them each month. Make sure to take photos both up close and from a distance. These can help you see if any moles change over time.   Most skin changes are not cancer. But if you see any changes in your skin, call your doctor right away. Only he or she can diagnose a problem. If you have skin cancer, seeing your doctor can be the first step toward getting the treatment that could save your life.   [employer] last reviewed this educational content on 4/1/2019 2000-2020 [employer]. [address] [place] [address], [place] [address]. All rights reserved. This information is not intended as a substitute for professional medical care. Always follow your healthcare professional's instructions.        When should I call my doctor?  If you are worsening or not improving, please, contact us or seek urgent care as noted below.      Who should I call with questions (adults)?  [facility] (adult and pediatric): [phone]  [facility]  [place] (adult): [phone]  [facility] ([place], [place], [place] and [place]) [phone]  For urgent needs outside of business hours call the [facility] at [phone] and ask for the dermatology resident on call to be paged  If this is a medical emergency and you are unable to reach an ER, Call 911        If you need a prescription refill, please contact your pharmacy. Refills are approved or denied by our Physicians during normal business hours, Monday through Fridays  Per office policy, refills will not be granted if you have not been seen within the past year (or sooner depending on your child's condition)

## 2024-08-20 NOTE — LETTER
"8/20/2024       RE: Claire De La Cruz  [address]  [facility] [address]     Dear Colleague,    Thank you for referring your patient, Claire De La Cruz, to the [facility] [place] at [facility]. Please see a copy of my visit note below.    [facility] Dermatology Note  Encounter Date: Aug 20, 2024  Office Visit     Reviewed patients past medical history and pertinent chart review prior to patients visit today.     Dermatology Problem List:  # Sebaceous hyperplasia  # Milia  - tretinoin 0.05% cream    No personal history of skin cancer.  No family history of skin cancer.  ____________________________________________    Assessment & Plan:     # Milia  # Sebaceous hyperplasia  - We reviewed the benign etiology of this skin finding.   - Tretinoin [Retin A] 0.05% cream was prescribed today. A \"pea\" sized amount should be applied to the entire face nightly. Initially, the cream should be used every 3rd night for two weeks, then every other night for two weeks, then nightly as tolerated to limit irritation. Moisturization after application of the tretinoin cream will help with discomfort should irritation develop.    # Multiple nevi, trunk and extremities  # Solar lentigines  - No concerning features on dermoscopy. We discussed the importance of self exams at home. ABCDE criteria and importance of photoprotection reviewed.     # Cherry angiomas  # Seborrheic keratoses  - The patient's lesions of concern involving the right hip and right lower back are consistent with seborrheic keratoses.   - We discussed the benign nature of the skin lesions. No treatment required. Continued observation recommended. Follow up with any concerns.      Follow-up:  Annual for follow up full body skin exam, as needed for new or changing lesions or new concerns    All risks, benefits and alternatives were discussed with patient.  Patient is in agreement " and understands the assessment and plan.  All questions were answered.  Heike Thrasher PA-C  [facility]    ____________________________________________    CC: Skin Check (Claire is here today for a skin check. She is concerned about moles on her lower back and abdomen. She is also concerned about bumps on her face.)    HPI:  Ms. Claire De La Cruz is a(n) 43 year old female who presents today as a new patient for a full body skin cancer screening. The patient requests evaluation of lesions on the right hip and right lower back. The lesions are longstanding, but have become more raised over time. Second, she reports bumps on her face that have increased in number over time. No pain, itching, or bleeding at the site. No other specific cutaneous concerns today. The patient reports trying to be diligent with photoprotection.      Physical Exam:  Vitals: There were no vitals taken for this visit.  SKIN: Total skin excluding the genitalia areas was performed. The exam included the scalp, face, neck, bilateral arms, chest, back, abdomen, bilateral legs, digits, mons pubis, buttocks, and nails.   - Posey II.  - Scattered on the face are yellow papules with coronal telangiectasias, consistent with sebaceous hyperplasia.   - Few <1 mm white papules on the bilateral upper cheeks, consistent with milia.   - Multiple tan/brown macules and papules scattered throughout exam, consistent with benign nevi. No concerning features on dermoscopy.   - Scattered tan, homogenous macules scattered on sun exposed skin, consistent with solar lentigines.   - Scattered waxy, stuck on appearing papules and patches, consistent with seborrheic keratoses.    - Several 1-2 mm red dome shaped symmetric papules, consistent with cherry angiomas.     Medications:  Current Outpatient Medications   Medication Sig Dispense Refill     amLODIPine (NORVASC) 5 MG tablet TAKE 1 TABLET(5 MG) BY MOUTH DAILY 90 tablet 3      hydrochlorothiazide (HYDRODIURIL) 25 MG tablet Take 1 tablet (25 mg) by mouth daily 90 tablet 3     lisinopril (ZESTRIL) 40 MG tablet Take 1 tablet (40 mg) by mouth daily 90 tablet 3     albuterol (PROAIR HFA/PROVENTIL HFA/VENTOLIN HFA) 108 (90 Base) MCG/ACT inhaler INHALE 2 PUFFS BY MOUTH EVERY 6 HOURS AS NEEDED FOR SHORTNESS OF BREATH, WHEEZING, OR COUGHING (Patient not taking: Reported on 8/20/2024) 8.5 g 0     atorvastatin (LIPITOR) 20 MG tablet Take 1 tablet (20 mg) by mouth daily (Patient not taking: Reported on 8/20/2024) 90 tablet 3     benzonatate (TESSALON) 100 MG capsule Take 1 capsule (100 mg) by mouth 3 times daily as needed for cough (Patient not taking: Reported on 8/20/2024) 30 capsule 0     No current facility-administered medications for this visit.      Past Medical History:   Patient Active Problem List   Diagnosis     Methylenetetrahydrofolate Reductase Deficiency     Vitamin D Deficiency     Hypertension     Menorrhagia with regular cycle     JE II (cervical intraepithelial neoplasia II)     Diabetes mellitus, type 2 (H)     Hepatic steatosis     Past Medical History:   Diagnosis Date     Abnormal Pap smear of cervix      JE II (cervical intraepithelial neoplasia II) 11/14/2008    10/14/08 ASCUS with positive HPV 11/14/08 Colpo JE II 12/3/08 LEEP mild squamous atypia, ECC neg 4/22/11 NIL 7/25/15 NIL pap, neg HPV 11/11/21 NIL pap, neg HPV. Plan: cotest in 1 year     Hypertension      Postpartum hemorrhage 2010       CC Sharifa Naylor MD  [address] [place] [phone] on close of this encounter.      Again, thank you for allowing me to participate in the care of your patient.      Sincerely,    Heike Thrasher PA-C

## 2024-08-23 ENCOUNTER — TELEPHONE (OUTPATIENT)
Dept: DERMATOLOGY | Facility: CLINIC | Age: 44
End: 2024-08-23
Payer: COMMERCIAL

## 2024-08-23 NOTE — TELEPHONE ENCOUNTER
Patient confirmed scheduled appointment:     Date: 8/22/24  Time: 7:45 AM  Visit type: Return dermatology  Provider: Heike Thrasher PA-C  Location: [facility]  Testing/imaging:   Additional Notes:

## 2024-09-28 ENCOUNTER — HEALTH MAINTENANCE LETTER (OUTPATIENT)
Age: 44
End: 2024-09-28

## 2024-09-30 ENCOUNTER — NURSE TRIAGE (OUTPATIENT)
Dept: FAMILY MEDICINE | Facility: CLINIC | Age: 44
End: 2024-09-30
Payer: COMMERCIAL

## 2024-09-30 DIAGNOSIS — N63.20 MASS OF LEFT BREAST, UNSPECIFIED QUADRANT: Primary | ICD-10-CM

## 2024-09-30 NOTE — TELEPHONE ENCOUNTER
Order/Referral Request    Who is requesting: pt    Orders being requested: diagnostic mammogram    Reason service is needed/diagnosis: cyst for over 8 weeks. If cyst is there longer than 6 weeks pt needs diagnostic mammogram    When are orders needed by: asap    Has this been discussed with Provider: Yes    Does patient have a preference on a Group/Provider/Facility? [facility]    Does patient have an appointment scheduled?: No    Where to send orders: Place orders within Epic    Could we send this information to you in Lingt or would you prefer to receive a phone call?:   Patient would like to be contacted via Lingt

## 2024-10-02 NOTE — TELEPHONE ENCOUNTER
Provider Recommendation Follow Up:   Reached patient/caregiver. Informed of provider's recommendations. Patient verbalized understanding and agrees with the plan.     KEVEN GarciaN, RN   [facility]

## 2024-10-02 NOTE — TELEPHONE ENCOUNTER
"Nurse Triage SBAR    Is this a 2nd Level Triage? NO    Situation:   Breast cysst located on left breast, occurred for 8 weeks no.    Background:   All new symptoms.    Assessment:   Location: left breast  Right breast is unaffected.  No nipple discharges. Left breast is painful to touch.  No visible rash on left breast. No chest pain.  Patient has had prior problems with breast before: lumps  Patient's never had them \"drained\"  No fever. Patient is NOT breast feeding.    Size of lump is about 3 cm x 2 cm     Patient says every time patient is seen in clinic, she is then sent to the breast center, because she has \"dense breast tissue.\" The regular machine at [facility] is unable to see anything.     Patient would like to complete a 3D mammogram    Protocol Recommended Disposition:   See in Office Today    Recommendation:   Care Advice given to patient.  Offer to schedule a visit for patient in the next 3 days. Patient declines to schedule a visit at this time and wants Dr. Naylor to help order a 3-D mammogram (as she has dense breast tissue). The regular mammogram at [facility] will not be able to see anything.     Patient says she does not want a second bill, and only wants Dr. Naylor to order the 3D Mammogram and she wants to schedule at the [facility] (which does have Saturday availabilities and works better for her schedule).    Patient is a Nurse, and has to get back to work.  OK to leave detailed VM: [phone]    Routed to provider    Does the patient meet one of the following criteria for ADS visit consideration? No    ANUM Garcia, RN   [facility]      Reason for Disposition   Breast lump    Additional Information   Negative: Chest pain   Negative: Breastfeeding questions about baby   Negative: Breastfeeding questions about mother (breast symptoms or feeling sick)   Negative: Breastfeeding questions about mother's medicines and diet   Negative: Postpartum breast " "pain and swelling, not breastfeeding   Negative: Small spot, skin growth or mole   Negative: Patient sounds very sick or weak to the triager   Negative: SEVERE breast pain and fever > 103 F  (39.4 C)   Negative: Breast looks infected (spreading redness, feels hot or painful to touch) and no fever   Negative: Painful rash and multiple small blisters grouped together (i.e., dermatomal distribution or \"band\" or \"stripe\")   Negative: Cuts, burns, or bruises of breasts and suspicious history for the injury   Negative: Breast looks infected (spreading redness, feels hot or painful to touch) and fever    Protocols used: Breast Symptoms-A-[place]    "

## 2024-10-02 NOTE — TELEPHONE ENCOUNTER
Placed bilateral mammogram - diagnostic. This is not a screening mammogram, so not sure about insurance coverage. I would still recommend an exam and visit prior to the mammogram, but I did order the mammogram per patient request.

## 2024-10-10 ENCOUNTER — ANCILLARY PROCEDURE (OUTPATIENT)
Dept: MAMMOGRAPHY | Facility: CLINIC | Age: 44
End: 2024-10-10
Attending: FAMILY MEDICINE
Payer: COMMERCIAL

## 2024-10-10 DIAGNOSIS — N63.20 MASS OF LEFT BREAST, UNSPECIFIED QUADRANT: ICD-10-CM

## 2024-10-10 PROCEDURE — 77066 DX MAMMO INCL CAD BI: CPT

## 2024-10-10 PROCEDURE — 76642 ULTRASOUND BREAST LIMITED: CPT | Mod: LT

## 2025-01-02 ENCOUNTER — PATIENT OUTREACH (OUTPATIENT)
Dept: CARE COORDINATION | Facility: CLINIC | Age: 45
End: 2025-01-02
Payer: COMMERCIAL

## 2025-01-16 ENCOUNTER — PATIENT OUTREACH (OUTPATIENT)
Dept: CARE COORDINATION | Facility: CLINIC | Age: 45
End: 2025-01-16
Payer: COMMERCIAL

## 2025-01-18 ENCOUNTER — HEALTH MAINTENANCE LETTER (OUTPATIENT)
Age: 45
End: 2025-01-18

## 2025-02-11 ENCOUNTER — TELEPHONE (OUTPATIENT)
Dept: FAMILY MEDICINE | Facility: CLINIC | Age: 45
End: 2025-02-11
Payer: COMMERCIAL

## 2025-02-11 NOTE — TELEPHONE ENCOUNTER
Patient Quality Outreach    Patient is due for the following:   Diabetes -  A1C, LDL (Fasting), Microalbumin, Diabetic Follow-Up Visit, and Foot Exam  Physical Preventive Adult Physical      Topic Date Due    Flu Vaccine (1) 09/01/2024    COVID-19 Vaccine (4 - 2024-25 season) 09/01/2024       Action(s) Taken:   Schedule a office visit for Diabetes     Type of outreach:    Phone, left message for patient/parent to call back. and Sent letter.    Questions for provider review:    None           Sharifa Thomas MA

## 2025-02-11 NOTE — LETTER
February 11, 2025      Claire De La Cruz  [address] [place] [address]      Your healthcare team cares about your health. To provide you with the best care, we have reviewed your chart and based on our findings, we see that you are due to:     Schedule a DIABETIC FOLLOW UP appointment for Office Visit. Patients with diabetes should see their provider regularly.  PREVENTATIVE VISIT: Physical    If you have already completed these items, please contact the clinic via phone or Mychart so your care team can review and update your records.  Thank you for choosing [facility] for your healthcare needs. For any questions, concerns, or to schedule an appointment please contact the clinic.     Healthy Regards,    Your [facility] Care Team

## 2025-03-15 ENCOUNTER — HEALTH MAINTENANCE LETTER (OUTPATIENT)
Age: 45
End: 2025-03-15

## 2025-04-24 DIAGNOSIS — I10 HYPERTENSION, UNSPECIFIED TYPE: ICD-10-CM

## 2025-04-24 DIAGNOSIS — I10 ESSENTIAL HYPERTENSION: ICD-10-CM

## 2025-04-24 RX ORDER — AMLODIPINE BESYLATE 5 MG/1
5 TABLET ORAL
Qty: 90 TABLET | Refills: 3 | Status: SHIPPED | OUTPATIENT
Start: 2025-04-24

## 2025-04-24 RX ORDER — HYDROCHLOROTHIAZIDE 25 MG/1
25 TABLET ORAL
Qty: 90 TABLET | Refills: 3 | Status: SHIPPED | OUTPATIENT
Start: 2025-04-24

## 2025-04-26 ENCOUNTER — HEALTH MAINTENANCE LETTER (OUTPATIENT)
Age: 45
End: 2025-04-26

## 2025-07-01 DIAGNOSIS — E11.9 TYPE 2 DIABETES MELLITUS WITHOUT COMPLICATION, WITHOUT LONG-TERM CURRENT USE OF INSULIN (H): Primary | ICD-10-CM

## 2025-07-02 ENCOUNTER — OFFICE VISIT (OUTPATIENT)
Dept: FAMILY MEDICINE | Facility: CLINIC | Age: 45
End: 2025-07-02
Payer: COMMERCIAL

## 2025-07-02 ENCOUNTER — RESULTS FOLLOW-UP (OUTPATIENT)
Dept: FAMILY MEDICINE | Facility: CLINIC | Age: 45
End: 2025-07-02

## 2025-07-02 VITALS
TEMPERATURE: 97.8 F | HEIGHT: 68 IN | BODY MASS INDEX: 32.96 KG/M2 | OXYGEN SATURATION: 97 % | WEIGHT: 217.5 LBS | DIASTOLIC BLOOD PRESSURE: 88 MMHG | HEART RATE: 84 BPM | SYSTOLIC BLOOD PRESSURE: 122 MMHG | RESPIRATION RATE: 16 BRPM

## 2025-07-02 DIAGNOSIS — N92.0 MENORRHAGIA WITH REGULAR CYCLE: ICD-10-CM

## 2025-07-02 DIAGNOSIS — E11.9 TYPE 2 DIABETES MELLITUS WITHOUT COMPLICATION, WITHOUT LONG-TERM CURRENT USE OF INSULIN (H): ICD-10-CM

## 2025-07-02 DIAGNOSIS — F41.9 ANXIETY: ICD-10-CM

## 2025-07-02 DIAGNOSIS — I10 HYPERTENSION, UNSPECIFIED TYPE: ICD-10-CM

## 2025-07-02 DIAGNOSIS — R53.83 OTHER FATIGUE: ICD-10-CM

## 2025-07-02 DIAGNOSIS — N95.1 MENOPAUSAL SYNDROME (HOT FLASHES): ICD-10-CM

## 2025-07-02 DIAGNOSIS — Z00.00 ROUTINE GENERAL MEDICAL EXAMINATION AT A HEALTH CARE FACILITY: Primary | ICD-10-CM

## 2025-07-02 PROBLEM — E11.65 TYPE 2 DIABETES MELLITUS WITH HYPERGLYCEMIA, WITHOUT LONG-TERM CURRENT USE OF INSULIN (H): Status: ACTIVE | Noted: 2023-01-08

## 2025-07-02 LAB
ALBUMIN SERPL BCG-MCNC: 4.5 G/DL (ref 3.5–5.2)
ALP SERPL-CCNC: 79 U/L (ref 40–150)
ALT SERPL W P-5'-P-CCNC: 63 U/L (ref 0–50)
ANION GAP SERPL CALCULATED.3IONS-SCNC: 14 MMOL/L (ref 7–15)
AST SERPL W P-5'-P-CCNC: 44 U/L (ref 0–45)
BILIRUB SERPL-MCNC: 0.8 MG/DL
BUN SERPL-MCNC: 13 MG/DL (ref 6–20)
CALCIUM SERPL-MCNC: 10.1 MG/DL (ref 8.8–10.4)
CHLORIDE SERPL-SCNC: 96 MMOL/L (ref 98–107)
CHOLEST SERPL-MCNC: 160 MG/DL
CREAT SERPL-MCNC: 0.67 MG/DL (ref 0.51–0.95)
CREAT UR-MCNC: 196 MG/DL
EGFRCR SERPLBLD CKD-EPI 2021: >90 ML/MIN/1.73M2
ERYTHROCYTE [DISTWIDTH] IN BLOOD BY AUTOMATED COUNT: 12.1 % (ref 10–15)
EST. AVERAGE GLUCOSE BLD GHB EST-MCNC: 258 MG/DL
FASTING STATUS PATIENT QL REPORTED: YES
FASTING STATUS PATIENT QL REPORTED: YES
GLUCOSE SERPL-MCNC: 306 MG/DL (ref 70–99)
HBA1C MFR BLD: 10.6 % (ref 0–5.6)
HCO3 SERPL-SCNC: 25 MMOL/L (ref 22–29)
HCT VFR BLD AUTO: 42.5 % (ref 35–47)
HDLC SERPL-MCNC: 36 MG/DL
HGB BLD-MCNC: 15.5 G/DL (ref 11.7–15.7)
LDLC SERPL CALC-MCNC: 55 MG/DL
MCH RBC QN AUTO: 30.9 PG (ref 26.5–33)
MCHC RBC AUTO-ENTMCNC: 36.5 G/DL (ref 31.5–36.5)
MCV RBC AUTO: 85 FL (ref 78–100)
MICROALBUMIN UR-MCNC: 52.8 MG/L
MICROALBUMIN/CREAT UR: 26.94 MG/G CR (ref 0–25)
NONHDLC SERPL-MCNC: 124 MG/DL
PLATELET # BLD AUTO: 241 10E3/UL (ref 150–450)
POTASSIUM SERPL-SCNC: 3.9 MMOL/L (ref 3.4–5.3)
PROT SERPL-MCNC: 7.5 G/DL (ref 6.4–8.3)
RBC # BLD AUTO: 5.01 10E6/UL (ref 3.8–5.2)
SODIUM SERPL-SCNC: 135 MMOL/L (ref 135–145)
TRIGL SERPL-MCNC: 347 MG/DL
TSH SERPL DL<=0.005 MIU/L-ACNC: 2.05 UIU/ML (ref 0.3–4.2)
WBC # BLD AUTO: 7.3 10E3/UL (ref 4–11)

## 2025-07-02 PROCEDURE — 82043 UR ALBUMIN QUANTITATIVE: CPT | Performed by: FAMILY MEDICINE

## 2025-07-02 PROCEDURE — 99214 OFFICE O/P EST MOD 30 MIN: CPT | Mod: 25 | Performed by: FAMILY MEDICINE

## 2025-07-02 PROCEDURE — 85027 COMPLETE CBC AUTOMATED: CPT | Performed by: FAMILY MEDICINE

## 2025-07-02 PROCEDURE — 80053 COMPREHEN METABOLIC PANEL: CPT | Performed by: FAMILY MEDICINE

## 2025-07-02 PROCEDURE — 3046F HEMOGLOBIN A1C LEVEL >9.0%: CPT | Performed by: FAMILY MEDICINE

## 2025-07-02 PROCEDURE — 3079F DIAST BP 80-89 MM HG: CPT | Performed by: FAMILY MEDICINE

## 2025-07-02 PROCEDURE — 83036 HEMOGLOBIN GLYCOSYLATED A1C: CPT | Performed by: FAMILY MEDICINE

## 2025-07-02 PROCEDURE — 99396 PREV VISIT EST AGE 40-64: CPT | Performed by: FAMILY MEDICINE

## 2025-07-02 PROCEDURE — 80061 LIPID PANEL: CPT | Performed by: FAMILY MEDICINE

## 2025-07-02 PROCEDURE — 36415 COLL VENOUS BLD VENIPUNCTURE: CPT | Performed by: FAMILY MEDICINE

## 2025-07-02 PROCEDURE — 84443 ASSAY THYROID STIM HORMONE: CPT | Performed by: FAMILY MEDICINE

## 2025-07-02 PROCEDURE — 3074F SYST BP LT 130 MM HG: CPT | Performed by: FAMILY MEDICINE

## 2025-07-02 PROCEDURE — 82570 ASSAY OF URINE CREATININE: CPT | Performed by: FAMILY MEDICINE

## 2025-07-02 RX ORDER — PEN NEEDLE, DIABETIC 30 GX3/16"
1 NEEDLE, DISPOSABLE MISCELLANEOUS DAILY
Qty: 100 EACH | Refills: 5 | Status: SHIPPED | OUTPATIENT
Start: 2025-07-02

## 2025-07-02 RX ORDER — AMLODIPINE BESYLATE 5 MG/1
5 TABLET ORAL
Qty: 90 TABLET | Refills: 3 | Status: SHIPPED | OUTPATIENT
Start: 2025-07-02

## 2025-07-02 RX ORDER — PAROXETINE 10 MG/1
10 TABLET, FILM COATED ORAL EVERY MORNING
Qty: 90 TABLET | Refills: 3 | Status: SHIPPED | OUTPATIENT
Start: 2025-07-02 | End: 2025-07-02

## 2025-07-02 RX ORDER — BLOOD PRESSURE TEST KIT
1 KIT MISCELLANEOUS DAILY
Qty: 100 EACH | Refills: 4 | Status: SHIPPED | OUTPATIENT
Start: 2025-07-02

## 2025-07-02 RX ORDER — HYDROCHLOROTHIAZIDE 12.5 MG/1
CAPSULE ORAL
Qty: 6 EACH | Refills: 3 | Status: SHIPPED | OUTPATIENT
Start: 2025-07-02

## 2025-07-02 RX ORDER — ALBUTEROL SULFATE 90 UG/1
1-2 INHALANT RESPIRATORY (INHALATION) EVERY 6 HOURS PRN
Qty: 8.5 G | Refills: 0 | Status: SHIPPED | OUTPATIENT
Start: 2025-07-02

## 2025-07-02 RX ORDER — INSULIN GLARGINE 100 [IU]/ML
INJECTION, SOLUTION SUBCUTANEOUS
Qty: 15 ML | Refills: 1 | Status: SHIPPED | OUTPATIENT
Start: 2025-07-02

## 2025-07-02 RX ORDER — HYDROCHLOROTHIAZIDE 25 MG/1
25 TABLET ORAL
Qty: 90 TABLET | Refills: 3 | Status: SHIPPED | OUTPATIENT
Start: 2025-07-02

## 2025-07-02 RX ORDER — LISINOPRIL 40 MG/1
40 TABLET ORAL DAILY
Qty: 90 TABLET | Refills: 3 | Status: SHIPPED | OUTPATIENT
Start: 2025-07-02

## 2025-07-02 RX ORDER — KETOROLAC TROMETHAMINE 30 MG/ML
1 INJECTION, SOLUTION INTRAMUSCULAR; INTRAVENOUS ONCE
Qty: 1 EACH | Refills: 0 | Status: SHIPPED | OUTPATIENT
Start: 2025-07-02 | End: 2025-07-02

## 2025-07-02 RX ORDER — SERTRALINE HYDROCHLORIDE 25 MG/1
25 TABLET, FILM COATED ORAL DAILY
Qty: 30 TABLET | Refills: 11 | Status: SHIPPED | OUTPATIENT
Start: 2025-07-02

## 2025-07-02 SDOH — HEALTH STABILITY: PHYSICAL HEALTH: ON AVERAGE, HOW MANY DAYS PER WEEK DO YOU ENGAGE IN MODERATE TO STRENUOUS EXERCISE (LIKE A BRISK WALK)?: 5 DAYS

## 2025-07-02 ASSESSMENT — SOCIAL DETERMINANTS OF HEALTH (SDOH): HOW OFTEN DO YOU GET TOGETHER WITH FRIENDS OR RELATIVES?: ONCE A WEEK

## 2025-07-02 NOTE — PROGRESS NOTES
Preventive Care Visit  [facility] [place] RUTHIE Naylor MD, Family Medicine  Jul 2, 2025      Assessment & Plan     Routine general medical examination at a health care facility    Type 2 diabetes mellitus without complication, without long-term current use of insulin (H): A1c >10. Reviewed options with patient- plan to start GLP-1 and titrate dose up, however, will also start basal insulin to bring down A1C quickly, as we can titrate up other medications. She did not tolerate metformin due to brain fog. Concerned about statin since she had brain fog when she started both metformin and statin together, but will trial statin again after reviewing risk-benefit. 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 Patient will let me know how she is doing on GLP-1 and if she needs increased dose- no appointment would be necessary.   - Hemoglobin A1c  - Comprehensive metabolic panel (BMP + Alb, Alk Phos, ALT, AST, Total. Bili, TP)  - CBC with platelets  - Lipid panel reflex to direct LDL Fasting  - Albumin Random Urine Quantitative with Creat Ratio  - Continuous Glucose  (FREESTYLE BRIAN 3 READER) ESTHER  Dispense: 1 each; Refill: 0  - Continuous Glucose Sensor (FREESTYLE BRIAN 3 PLUS SENSOR) MISC  Dispense: 6 each; Refill: 3  - tirzepatide (MOUNJARO) 2.5 MG/0.5ML SOAJ auto-injector pen  Dispense: 2 mL; Refill: 1  - Insulin Pen Needle (PEN NEEDLES) 32G X 4 MM MISC  Dispense: 100 each; Refill: 5  - Alcohol Swabs PADS  Dispense: 100 each; Refill: 4  - Adult Diabetes Educator Referral - Urgent Insulin Start (Provider Must Select Priority)  - insulin glargine (LANTUS SOLOSTAR) 100 UNIT/ML pen  Dispense: 15 mL; Refill: 1  - Hemoglobin A1c    Hypertension, unspecified type: BP at goal. Continue medications.  - lisinopril (ZESTRIL) 40 MG tablet  Dispense: 90 tablet; Refill: 3  - hydrochlorothiazide  "(HYDRODIURIL) 25 MG tablet  Dispense: 90 tablet; Refill: 3  - amLODIPine (NORVASC) 5 MG tablet  Dispense: 90 tablet; Refill: 3    Vasomotor symptoms  Menorrhagia with regular cycle: CBC normal without anemia. MTHFR deficiency so unable to use estrogen containing birth controls- I did review that she could consider mirena IUD, however, she is more interested in non-hormonal methods to manage her menorrhagia, such as ablation. She did have pelvic US 2019 that was normal. Refer to OB/Gyn.   - Ob/Gyn  Referral    Other fatigue: Likely related to hyperglycemia from uncontrolled DM type 2, but will check labs.  - TSH with free T4 reflex  - TSH with free T4 reflex    Anxiety: Plan to start low dose selective serotonin reuptake inhibitor and titrate up if needed. Patient can let me know via [address] [employer] if she wants to titrate up medication- she would not need an appointment.   - sertraline (ZOLOFT) 25 MG tablet  Dispense: 30 tablet; Refill: 11        BMI  Estimated body mass index is 33.07 kg/m  as calculated from the following:    Height as of this encounter: 1.727 m (5' 8\").    Weight as of this encounter: 98.7 kg (217 lb 8 oz).       Counseling  Appropriate preventive services were addressed with this patient via screening, questionnaire, or discussion as appropriate for fall prevention, nutrition, physical activity, Tobacco-use cessation, social engagement, weight loss and cognition.  Checklist reviewing preventive services available has been given to the patient.  Reviewed patient's diet, addressing concerns and/or questions.   She is at risk for psychosocial distress and has been provided with information to reduce risk.             Milady Rangel is a 44 year old, presenting for the following:  Physical        7/2/2025     8:06 AM   Additional Questions   Roomed by Harry Baum CMA   Accompanied by self        Via the Health Maintenance questionnaire, the patient has reported the following services have " been completed -Eye Exam: [facility] 2024-08-01, this information has been sent to the abstraction team.    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  She reports that she fixates on certain things- saw a mouse in the house and now feels like she things crawling over her at times. Her partner has mentioned that she seems fixated on things.  Interested in starting GLP-1 for her diabetes  Discontinued metformin due to brain fog  Has coughing- mostly at night. This becomes worse when she has covid- has had covid multiple times and develops prolonged cough  Her periods are regular but heavy- she is interested in ablation. She had paragard IUD previously- hx of MTHFR deficiency  Has not been taking statin, but takes her 3 blood pressure medications regularly.      Advance Care Planning    Not on file        7/2/2025   General Health   How would you rate your overall physical health? (!) FAIR   Feel stress (tense, anxious, or unable to sleep) To some extent   (!) STRESS CONCERN      7/2/2025   Nutrition   Three or more servings of calcium each day? (!) NO   Diet: Regular (no restrictions)   How many servings of fruit and vegetables per day? (!) 0-1   How many sweetened beverages each day? 0-1         7/2/2025   Exercise   Days per week of moderate/strenous exercise 5 days         7/2/2025   Social Factors   Frequency of gathering with friends or relatives Once a week   Worry food won't last until get money to buy more No   Food not last or not have enough money for food? No   Do you have housing? (Housing is defined as stable permanent housing and does not include staying outside in a car, in a tent, in an abandoned building, in an overnight shelter, or couch-surfing.) Yes   Are you worried about losing your housing?  No   Lack of transportation? No   Unable to get utilities (heat,electricity)? No         7/2/2025   Dental   Dentist two times every year? Yes         Today's PHQ-2 Score:       7/2/2025     7:57 AM   PHQ-2 ( 1999 Pf[employer])   Q1: Little interest or pleasure in doing things 1   Q2: Feeling down, depressed or hopeless 0   PHQ-2 Score 1    Q1: Little interest or pleasure in doing things Several days   Q2: Feeling down, depressed or hopeless Not at all   PHQ-2 Score 1       Patient-reported           7/2/2025   Substance Use   Alcohol more than 3/day or more than 7/wk No   Do you use any other substances recreationally? No     Social History     Tobacco Use    Smoking status: Never     Passive exposure: Never    Smokeless tobacco: Never   Vaping Use    Vaping status: Never Used   Substance Use Topics    Alcohol use: Yes     Comment: Alcoholic Drinks/day: Seldom    Drug use: No           1/12/2023   LAST FHS-7 RESULTS   1st degree relative breast or ovarian cancer No   Any relative bilateral breast cancer No   Any male have breast cancer No   Any ONE woman have BOTH breast AND ovarian cancer No   Any woman with breast cancer before 50yrs No   2 or more relatives with breast AND/OR ovarian cancer No   2 or more relatives with breast AND/OR bowel cancer No                7/2/2025   STI Screening   New sexual partner(s) since last STI/HIV test? No           Latest Ref Rng & Units 1/4/2023     3:03 PM 11/11/2021     7:59 AM 7/24/2015     9:49 AM   PAP / HPV   PAP  Negative for Intraepithelial Lesion or Malignancy (NILM)  Negative for Intraepithelial Lesion or Malignancy (NILM)  Negative for squamous intraepithelial lesion or malignancy  Electronically signed by Chyna Brewer CT (ASCP) on 7/30/2015 at  2:22 PM      HPV 16 DNA Negative Negative  Negative     HPV 18 DNA Negative Negative  Negative     Other HR HPV Negative Negative  Negative       ASCVD Risk   The 10-year ASCVD risk score (Ami CARREON, et al., 2019) is:  "1.4%    Values used to calculate the score:      Age: 44 years      Sex: Female      Is Non- : No      Diabetic: Yes      Tobacco smoker: No      Systolic Blood Pressure: 122 mmHg      Is BP treated: Yes      HDL Cholesterol: 45 mg/dL      Total Cholesterol: 159 mg/dL        7/2/2025   Contraception/Family Planning   Questions about contraception or family planning No   What are your periods like? (!) HEAVY FLOW        Reviewed and updated as needed this visit by Provider    Allergies  Meds  Problems                        Objective    Exam  /88   Pulse 84   Temp 97.8  F (36.6  C) (Temporal)   Resp 16   Ht 1.727 m (5' 8\")   Wt 98.7 kg (217 lb 8 oz)   LMP 06/16/2025 (Exact Date)   SpO2 97%   BMI 33.07 kg/m     Estimated body mass index is 33.07 kg/m  as calculated from the following:    Height as of this encounter: 1.727 m (5' 8\").    Weight as of this encounter: 98.7 kg (217 lb 8 oz).    Physical Exam  GENERAL: alert and no distress  EYES: Eyes grossly normal to inspection, PERRL and conjunctivae and sclerae normal  HENT: excessive cerumen in L ear canal, nose and mouth without ulcers or lesions  NECK: no adenopathy, no asymmetry, masses, or scars  Breast:   RESP: lungs clear to auscultation - no rales, rhonchi or wheezes  CV: regular rate and rhythm, normal S1 S2, no S3 or S4, no murmur, click or rub, no peripheral edema  MS: no gross musculoskeletal defects noted, no edema  SKIN: no suspicious lesions or rashes  NEURO: Normal strength and tone, mentation intact and speech normal  PSYCH: mentation appears normal        Signed Electronically by: Sharifa Naylor MD    "

## 2025-07-02 NOTE — PATIENT INSTRUCTIONS
Diabetic educator will call within a few days to schedule a visit  Start checking sugars with CGM  Start insulin lantus 10 units daily -titrate as intructed      Patient Education   Preventive Care Advice   This is general advice given by our system to help you stay healthy. However, your care team may have specific advice just for you. Please talk to your care team about your preventive care needs.  Nutrition  Eat 5 or more servings of fruits and vegetables each day.  Try wheat bread, brown rice and whole grain pasta (instead of white bread, rice, and pasta).  Get enough calcium and vitamin D. Check the label on foods and aim for 100% of the RDA (recommended daily allowance).  Lifestyle  Exercise at least 150 minutes each week  (30 minutes a day, 5 days a week).  Do muscle strengthening activities 2 days a week. These help control your weight and prevent disease.  No smoking.  Wear sunscreen to prevent skin cancer.  Have a dental exam and cleaning every 6 months.  Yearly exams  See your health care team every year to talk about:  Any changes in your health.  Any medicines your care team has prescribed.  Preventive care, family planning, and ways to prevent chronic diseases.  Shots (vaccines)   HPV shots (up to age 26), if you've never had them before.  Hepatitis B shots (up to age 59), if you've never had them before.  COVID-19 shot: Get this shot when it's due.  Flu shot: Get a flu shot every year.  Tetanus shot: Get a tetanus shot every 10 years.  Pneumococcal, hepatitis A, and RSV shots: Ask your care team if you need these based on your risk.  Shingles shot (for age 50 and up)  General health tests  Diabetes screening:  Starting at age 35, Get screened for diabetes at least every 3 years.  If you are younger than age 35, ask your care team if you should be screened for diabetes.  Cholesterol test: At age 39, start having a cholesterol test every 5 years, or more often if advised.  Bone density scan (DEXA): At  age 50, ask your care team if you should have this scan for osteoporosis (brittle bones).  Hepatitis C: Get tested at least once in your life.  STIs (sexually transmitted infections)  Before age 24: Ask your care team if you should be screened for STIs.  After age 24: Get screened for STIs if you're at risk. You are at risk for STIs (including HIV) if:  You are sexually active with more than one person.  You don't use condoms every time.  You or a partner was diagnosed with a sexually transmitted infection.  If you are at risk for HIV, ask about PrEP medicine to prevent HIV.  Get tested for HIV at least once in your life, whether you are at risk for HIV or not.  Cancer screening tests  Cervical cancer screening: If you have a cervix, begin getting regular cervical cancer screening tests starting at age 21.  Breast cancer scan (mammogram): If you've ever had breasts, begin having regular mammograms starting at age 40. This is a scan to check for breast cancer.  Colon cancer screening: It is important to start screening for colon cancer at age 45.  Have a colonoscopy test every 10 years (or more often if you're at risk) Or, ask your provider about stool tests like a FIT test every year or Cologuard test every 3 years.  To learn more about your testing options, visit:   .  For help making a decision, visit:   https://bit.ly/tu15105.  Prostate cancer screening test: If you have a prostate, ask your care team if a prostate cancer screening test (PSA) at age 55 is right for you.  Lung cancer screening: If you are a current or former smoker ages 50 to 80, ask your care team if ongoing lung cancer screenings are right for you.  For informational purposes only. Not to replace the advice of your health care provider. Copyright   2023 [employer]. All rights reserved. Clinically reviewed by the [facility]. [employer] 668446 - REV 01/24.  Learning About Stress  What is stress?     Stress  is your body's response to a hard situation. Your body can have a physical, emotional, or mental response. Stress is a fact of life for most people, and it affects everyone differently. What causes stress for you may not be stressful for someone else.  A lot of things can cause stress. You may feel stress when you go on a job interview, take a test, or run a race. This kind of short-term stress is normal and even useful. It can help you if you need to work hard or react quickly. For example, stress can help you finish an important job on time.  Long-term stress is caused by ongoing stressful situations or events. Examples of long-term stress include long-term health problems, ongoing problems at work, or conflicts in your family. Long-term stress can harm your health.  How does stress affect your health?  When you are stressed, your body responds as though you are in danger. It makes hormones that speed up your heart, make you breathe faster, and give you a burst of energy. This is called the fight-or-flight stress response. If the stress is over quickly, your body goes back to normal and no harm is done.  But if stress happens too often or lasts too long, it can have bad effects. Long-term stress can make you more likely to get sick, and it can make symptoms of some diseases worse. If you tense up when you are stressed, you may develop neck, shoulder, or low back pain. Stress is linked to high blood pressure and heart disease.  Stress also harms your emotional health. It can make you villalobos, tense, or depressed. Your relationships may suffer, and you may not do well at work or school.  What can you do to manage stress?  You can try these things to help manage stress:   Do something active. Exercise or activity can help reduce stress. Walking is a great way to get started. Even everyday activities such as housecleaning or yard work can help.  Try yoga or rupa chi. These techniques combine exercise and meditation. You  may need some training at first to learn them.  Do something you enjoy. For example, listen to music or go to a movie. Practice your hobby or do volunteer work.  Meditate. This can help you relax, because you are not worrying about what happened before or what may happen in the future.  Do guided imagery. Imagine yourself in any setting that helps you feel calm. You can use online videos, books, or a teacher to guide you.  Do breathing exercises. For example:  From a standing position, bend forward from the waist with your knees slightly bent. Let your arms dangle close to the floor.  Breathe in slowly and deeply as you return to a standing position. Roll up slowly and lift your head last.  Hold your breath for just a few seconds in the standing position.  Breathe out slowly and bend forward from the waist.  Let your feelings out. Talk, laugh, cry, and express anger when you need to. Talking with supportive friends or family, a counselor, or a sarah leader about your feelings is a healthy way to relieve stress. Avoid discussing your feelings with people who make you feel worse.  Write. It may help to write about things that are bothering you. This helps you find out how much stress you feel and what is causing it. When you know this, you can find better ways to cope.  What can you do to prevent stress?  You might try some of these things to help prevent stress:  Manage your time. This helps you find time to do the things you want and need to do.  Get enough sleep. Your body recovers from the stresses of the day while you are sleeping.  Get support. Your family, friends, and community can make a difference in how you experience stress.  Limit your news feed. Avoid or limit time on social media or news that may make you feel stressed.  Do something active. Exercise or activity can help reduce stress. Walking is a great way to get started.  Where can you learn more?  Go to https://www.healthwise.net/patiented  Enter  "N032 in the search box to learn more about \"Learning About Stress.\"  Current as of: October 24, 2024  Content Version: 14.5 2024-2025 [employer].   Care instructions adapted under license by your healthcare professional. If you have questions about a medical condition or this instruction, always ask your healthcare professional. [employer] disclaims any warranty or liability for your use of this information.       "

## 2025-07-03 ENCOUNTER — PATIENT OUTREACH (OUTPATIENT)
Dept: CARE COORDINATION | Facility: CLINIC | Age: 45
End: 2025-07-03
Payer: COMMERCIAL

## 2025-07-07 ENCOUNTER — VIRTUAL VISIT (OUTPATIENT)
Dept: EDUCATION SERVICES | Facility: CLINIC | Age: 45
End: 2025-07-07
Attending: FAMILY MEDICINE
Payer: COMMERCIAL

## 2025-07-07 DIAGNOSIS — E11.9 TYPE 2 DIABETES MELLITUS WITHOUT COMPLICATION, WITHOUT LONG-TERM CURRENT USE OF INSULIN (H): ICD-10-CM

## 2025-07-07 PROCEDURE — G0108 DIAB MANAGE TRN  PER INDIV: HCPCS | Mod: 95 | Performed by: PHARMACIST

## 2025-07-07 NOTE — LETTER
7/7/2025         RE: Claire De La Cruz  [address]  [facility] [address]        Dear Colleague,    Thank you for referring your patient, Claire De La Cruz, to the [facility] DIABETES EDUCATION [place]. Please see a copy of my visit note below.    Diabetes Self-Management Education & Support    Presents for: CGM Review in DM2    Type of service:  Video Visit    If the video visit is dropped, the video visit invitation should be resent by: Text to cell phone: [phone]    Originating Location (pt. Location): Other work in [place], [place]  Distant Location (provider location): [facility]ABET[facility]  Mode of Communication:  Video Conference via [employer]    Video Start Time: 1054am  Video End Time (time video stopped): 1130am    How would patient like to obtain AVS? MyChart      Assessment    CGM summary, last 2 weeks:  Avg  mg/dL  GMI NA%  Time in Range (TIR)  mg/dL is 0%  0% low under 70 mg/dL (0% less than 54)  53% high 181-250 mg/dL  47% very high over 250 mg/dL  Glucose trends show: high SG average 24 hours a day.  Daily sensor glucose data shows: srinath is present, a couple higher post meal spikes.    Claire's blood sugars are still quite elevated despite new basal insulin use.  She has been on 10 units daily and did not self titrate (missed this instruction).  I increased her dose up to 15 units tonight (0.15 units/kg).  It is ok to start Mounjaro now as it takes weeks to get to full effect.  Although, some are hyper-responders and we reviewed that as well.  She should not increase Lantus until after she sees how she responds to Mounjaro.  Plans to start Mounjaro Friday. I reviewed importance of protein/carbs/fiber in general with diabetes and how to eat to help avoid side effects with Mounjaro.  If Bgs still over 200 after few days of Mounjaro, she will increase Lantus to 17 units if needed. We also talked about decreasing insulin 20% if fastings get down into  80s or less. Message me if assistance is needed in coming weeks.  Follow-up scheduled in 2 weeks.  We did also discuss how she may feel low when not really low as she has been so high but hopefully, things will drop slowly enough that it will not happen.  Discussed how a post meal rise should be 60mg/dl or less.  Explained how to log meals in her tammi.    CGM Report:          Care Plan and Education Provided:  Healthy Eating: Balanced meals, Consistency in amount and timing of carbohydrate intake, Plate planning method, and Portion control  Monitoring: Individual glucose targets and Log and interpret results  Taking Medication: Action of prescribed medication(s), Side effects of prescribed medication(s), and When to take medication(s)  Problem Solving: High glucose - causes, signs/symptoms, treatment and prevention and When to call a health care provider    Patient verbalized understanding of diabetes self-management education concepts discussed, opportunities for ongoing education and support, and recommendations provided today.    Plan    Patient Instructions   Increase Lantus to 15 units tonight.  Stay on this dose at least until after you start the Mounjaro.  Next step will be 17 units, if needed (fasting sugars still over 200).  Do not increase insulin more than 2 units every 3 days.  Cut dose 20% if fasting sugars get under 90.  Start Mounjaro 2.5mg weekly.  Friday is fine.  Remember to eat smaller more frequent meals.  Avoid spicy/greasy foods.    Nafisa Koch, PharmD, [facility], [facility] and [place] [facility]    [facility] for the [facility] Area:  For Your Diabetes Education and Nutrition Appointments Call:  [phone]   For Diabetes Education or Nutrition Related Questions:   Send [employer] Message   If you need a medication refill please contact your pharmacy. Please allow 3 business days for your refills to be completed.  I truly appreciate  your feedback on our appointment together. You will either receive an email, text message, and/or phone call survey about today's appointment. Please complete this survey as soon as you are able so I can continue to improve upon my practice. Thank you!    You have been started on a new medication for your diabetes called Mounjaro.    In Type 2 Diabetes, you may not be making enough gut hormones that help manage appetite, stomach fullness, glucose levels after meals and weight.     You should NOT use Mounjaro if you have a history of pancreatitis, severe gastroparesis or thyroid cancer in you or your family.      Mounjaro is a once a week non-insulin injection with a combination of 2 gut hormones.    With Mounjaro, there are different doses which are gradually increased to limit side effects. The starting dose is 2.5mg once a week.       Benefits: This medication will help you feel full with a smaller amount of food. It will lower your blood sugars after meals. The benefits are improved blood sugars, decrease in appetite and possible weight loss.     Side effects: Side effects from Mounjaro are more likely in the first 10-14 days of use and usually decrease after that. You may experience nausea if you eat too much with this medication, therefore you should eat LESS to avoid nausea.  It can be helpful to eat slower and eat smaller, more frequent meals rather than 3 big meals per day. Stop eating when you are no longer hungry. Eat bland foods like crackers, rice, toast.  Foods/drinks with bay can also help (sugar free gingerale or bay tea).  For severe nausea, you can ask your doctor for an anti-nausea prescription.  Heartburn or indigestion are more likely with this medication especially if you eat a larger amount of food or you lay down after eating. Avoiding fried or greasy foods helps as well. You may also notice a change in your bowel movements (diarrhea or constipation).  If you experience extreme upper  "abdominal pain STOP THIS MEDICATION IMMEDIATELY AND CALL YOUR PROVIDER.     With Mounjaro, if you are of child-bearing age and on oral contraception, use non-oral, barrier contraception for 4 weeks at the start AND at each dose change in Mounjaro.  Do not take Mounjaro if you are pregnant, planning to become pregnant or breastfeeding. Check with your healthcare provider about an alternative.     Visit the Mounjaro website for more information on the medication and for product vouchers/savings cards: [email or web address]    Instructions on how to use the Mounjaro pen:              Freestyle Guille 3 or 3+    SENSOR BASICS:  Understand that your glucose sensor measures your glucose in your interstitial fluid and your blood sugar measures your glucose in your blood stream. The readings are not meant to be the same.        Your sensor glucose will lag behind your blood glucose.  \"Remember the roller coaster\".  Your blood sugar is always the front car and your sensor glucose is always the last car.  When blood sugar is moving up or down, the more difference there will be.          Take it easy while wearing the sensor.  Take extra care while bathing and getting dressed.  Wear loose fitting clothes on your sensor and try to avoid laying on your sensor.  You can shower/bathe with the sensor on.  But avoid submerging the sensor more than 3 feet for more than 30 minutes.  Gently pat to dry.    INITIAL SET UP:  Download the Guille by [employer] tammi if using your smartphone and create an account.  The tammi will walk you through set up.  If you are using the Guille 3 , turn it on and follow instructions for set up.                    Guille by Bal[employer]zar:   There will be a 60 minute warm up for each new sensor.  Each sensor should last 14 days.  Do not take more than 500mg of vitamin C (Guille 3+ more than 1000mg) per day or this can affect sensor accuracy.    The first 12 hours of every new sensor often a little \"off\" as it " "gets to know your body fluids.  Set glucose alarms for highs and lows per your preference or at the recommendations of your diabetes educator.  You will not be able to silence or turn off the urgent low alert at 54 mg/dL.  If an alarm goes off, go to your tammi and acknowledge it or you will continue to get alarmed every 5 minutes.  Your Guille 3 tammi or  will notify you when it is time to change your sensor.  Just remove it like a band aid and throw it in the trash, then place a new sensor.  It is recommended to switch arms with every sensor.    DAILY ROUTINE:  Keep your phone or  within 33 feet of you to keep data communicating with your device.  If you are away from your device for more than 20 minutes, your device will alarm.  Be curious with what the sensor data shows.  How do your food choices impact your glucose?  Exercise?  Complete a fingerstick glucose check at these times:                          -when you feel differently than the sensor indicates                          -when you are not wearing a sensor                          -when you see this symbol:     DATA SHARING:  If you want to share your sensor data with a loved one (for phone users only), send them an invitation through the Guille 3 tammi.  They will use the Whi tammi and accept your invitation.      Whi tammi:   Our clinic will link to your data as well (phone users only). Under the menu (3 lines in the upper left corner), go to connected apps, then touch \"connect\" next to [employer], and then \"connect to a practice\".  Enter our practice ID \"[id number]\" and hit connect.  Patients using the reader can upload from home via desktop or laptop computer on [employer] or will have the  downloaded in clinic.     OTHER IMPORTANT NOTES:  If the sensor is often falling off before the 14 days are up, there are products than can help.  Talk to your diabetes educator.  You can leave your Guille sensor ON for radiology scans " "(Xray, CT scan or MRI), however it is recommended that you use fingersticks for accurate readings during and 1 hour after an MRI as the accuracy of the sensor is questionable during this time.  If you need help with setting up your tammi, starting a new sensor or other training, you can call [phone] or sign up at: [email or web address]  They can help you 1:1 by phone or virtual visit.  Contact the  if the sensor does not last the full 14 days for any reason, or if you have any other technical problems. Keep your Guille sensor box with the lot and serial numbers as they often ask for this information.  -Guille customer service phone number: [phone]     -Website for guille replacement due to sensor failure or falling off: https://www.[employer]/us-en/support/sensor-support-form-questions.html  If your copay is more than $75 a month, call the copay assistance line at [phone] and they will work with your pharmacy to get your cost down.  Or, you can give the pharmacy the following information:      [id number], Group: [id number], Static ID: [id number], EXP: 12/31/25     Turn off automatic OS updates and do not update your phone's OS until you check your diabetes device 's website to verify that the medical apps you use are compatible with the new OS version. Turn off automatic OS updates by navigating to your system settings, usually accessible through a gear icon, and find the \"software update\" option; within this section, look for a toggle switch labeled \"automatic updates\" or similar, and disable it.    After updating your OS or adding a new accessory such as wireless headphones, confirm alert settings and then carefully monitor your medical device tammi to make sure you can receive and hear alerts as expected.    At least once a month, check that your smartphone alerts are configured as expected. Ensure your volume, vibration, notifications, and other relevant " "settings still work.         Topics to cover at upcoming visits: Healthy Eating, Being Active, Reducing Risks, and Healthy Coping    See Care Plan for co-developed, patient-state behavior change goals.    Education Materials Provided:  See AVS      Subjective/Objective  Claire is an 44 year old, presenting for the following diabetes education related to: CGM Review  Accompanied by: Self  Diabetes education in the past 24mo: (Patient-Rptd) No  Focus of Visit: Taking Medication  Diabetes type: (Patient-Rptd) Type 2  Disease course: (Patient-Rptd) Other (see Comments)  Please elaborate:: (Patient-Rptd) Not able to get blood sugars in 100s  How confident are you filling out medical forms by yourself:: (Patient-Rptd) Extremely  Diabetes management related comments/concerns: (Patient-Rptd) Trying to control blood sugars  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 Naylor for help with Mounjaro/Lantus titrations.  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  Metformin in the past has caused \"brain fog\".    Diabetes Medication(s)       Insulin       insulin glargine (LANTUS SOLOSTAR) 100 UNIT/ML pen Start with 10 units per day and titrate up by 2 units every 3 days until the patient reaches fasting plasma glucose target less than 130. Max 30 units per day.       Incretin Mimetic Agents       tirzepatide (MOUNJARO) 2.5 MG/0.5ML SOAJ auto-injector pen Inject 0.5 mLs (2.5 mg) subcutaneously once a week for 28 days.          Lab Results   Component Value Date    A1C 10.6 07/02/2025    A1C 8.6 02/01/2024    A1C 7.6 04/07/2023    A1C 6.9 01/04/2023    A1C 5.0 02/22/2011     Just started wearing Guille last week on Wednesday.  Is using phone.  Linked up data today.    Is taking Lantus 10 units daily at bedtime.  Started Wednesday so has had 5 doses.  Did not see that she could have self titrated doses.  Thought " "her DR said to wait until BGS are in 100s before Mounjaro so has not started it yet but she does have it.    Claire asks about if she will feel low when not really low.    Claire asks about why her Bgs are still really high when she is eating right, etc.    Claire asks what a good post meal spike should be.    She is eating low carb to try and help her blood sugars.      Diabetes Symptoms & Complications:  Diabetes Related Symptoms: (Patient-Rptd) Fatigue, Polydipsia (increased thirst), Polyphagia (increased hunger)  Weight trend: (Patient-Rptd) Increasing  Symptom course: (Patient-Rptd) Stable  Disease course: (Patient-Rptd) Other (see Comments)  Please elaborate:: (Patient-Rptd) Not able to get blood sugars in 100s  Complications assessed today?: No    Patient Problem List and Family Medical History reviewed for relevant medical history, current medical status, and diabetes risk factors.    Vitals:  LMP 06/16/2025 (Exact Date)   Estimated body mass index is 33.07 kg/m  as calculated from the following:    Height as of 7/2/25: 1.727 m (5' 8\").    Weight as of 7/2/25: 98.7 kg (217 lb 8 oz).   Last 3 BP:   BP Readings from Last 3 Encounters:   07/02/25 122/88   04/27/24 (!) 134/90   02/01/24 124/88       History   Smoking Status    Never   Smokeless Tobacco    Never       Labs:  Lab Results   Component Value Date    A1C 10.6 07/02/2025     Lab Results   Component Value Date     07/02/2025     01/12/2022     Lab Results   Component Value Date    LDL 55 07/02/2025     Direct Measure HDL   Date Value Ref Range Status   07/02/2025 36 (L) >=50 mg/dL Final     GFR Estimate   Date Value Ref Range Status   07/02/2025 >90 >60 mL/min/1.73m2 Final     Comment:     eGFR calculated using 2021 CKD-EPI equation.   03/13/2019 >60 >60 mL/min/1.73m2 Final     GFR Estimate If Black   Date Value Ref Range Status   03/13/2019 >60 >60 mL/min/1.73m2 Final     Lab Results   Component Value Date    CR 0.67 07/02/2025     Lab " Results   Component Value Date    MICROL 52.8 07/02/2025    UMALCR 26.94 (H) 07/02/2025    UCRR 196.0 07/02/2025 7/7/2025   Monitoring   Monitoring Assessed Today Yes   Blood Glucose Meter FreeStyle;CGM   Times checking blood sugar at home (number) 5+   Times checking blood sugar at home (per) Day   Blood glucose trend No change       Diabetes Medication(s)       Insulin       insulin glargine (LANTUS SOLOSTAR) 100 UNIT/ML pen Start with 10 units per day and titrate up by 2 units every 3 days until the patient reaches fasting plasma glucose target less than 130. Max 30 units per day.       Incretin Mimetic Agents       tirzepatide (MOUNJARO) 2.5 MG/0.5ML SOAJ auto-injector pen Inject 0.5 mLs (2.5 mg) subcutaneously once a week for 28 days.              7/7/2025   Taking Medications   Taking Medication Assessed Today Yes   Current Treatments Diet;Insulin Injections;Non-insulin Injectables   Dose schedule At bedtime   Problems taking diabetes medications regularly? No   Diabetes medication side effects? No         7/7/2025   Problem Solving   Problem Solving Assessed Today Yes   Is the patient at risk for hypoglycemia? Yes   Hypoglycemia Frequency Never   Does patient have glucagon emergency kit? No   Is the patient at risk for DKA? No   Does patient have severe weather/disaster plan for diabetes management? Not Needed   Hypoglycemia None   Hypoglycemia Complications None       Nafisa Koch, PharmD, [facility], [facility] and [place] Diabetes Education    Time Spent: 36 minutes  Encounter Type: Individual    Any diabetes medication dose changes were made via the [facility] under the patient's referring provider.

## 2025-07-07 NOTE — Clinical Note
"    7/7/2025         RE: Claire De La Cruz  [address]  [facility] [address]        Dear Colleague,    Thank you for referring your patient, Claire De La Cruz, to the [facility] DIABETES [facility]UC[facility] [place]. Please see a copy of my visit note below.    Claire is here for diabetes education, referred by her PCP, Dr. Naylor for help with Mounjaro/Lantus titrations.  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  Metformin in the past has caused \"brain fog\".    Diabetes Medication(s)       Insulin       insulin glargine (LANTUS SOLOSTAR) 100 UNIT/ML pen Start with 10 units per day and titrate up by 2 units every 3 days until the patient reaches fasting plasma glucose target less than 130. Max 30 units per day.       Incretin Mimetic Agents       tirzepatide (MOUNJARO) 2.5 MG/0.5ML SOAJ auto-injector pen Inject 0.5 mLs (2.5 mg) subcutaneously once a week for 28 days.          Lab Results   Component Value Date    A1C 10.6 07/02/2025    A1C 8.6 02/01/2024    A1C 7.6 04/07/2023    A1C 6.9 01/04/2023    A1C 5.0 02/22/2011         Diabetes Self-Management Education & Support    Presents for: CGM Review in DM2    Type of service:  Video Visit    If the video visit is dropped, the video visit invitation should be resent by: Text to cell phone: [phone]    Originating Location (pt. Location): Other work in [place], [place]  Distant Location (provider location): { location:206372}  Mode of Communication:  Video Conference via Magnetic    Video Start Time: {video visit start time:211803}  Video End Time (time video stopped): ***    How would patient like to obtain AVS? {AVS Preference:702006}      Assessment    CGM summary, last 2 weeks:  Avg  mg/dL  GMI NA%  Time in Range (TIR)  mg/dL is 0%  0% low under 70 mg/dL (0% less than 54)  53% high 181-250 mg/dL  47% very high over 250 mg/dL  Glucose trends show: high SG average 24 hours a day.  Daily sensor glucose data shows: srinath is present, a couple higher " post meal spikes.    Claire's blood sugars are still quite elevated despite new basal insulin use.  She has been on 10 units daily and did not self titrate (missed this instruction).  I increased her dose up to 15 units tonight (0.15 units/kg).  It is ok to start Mounjaro now as it takes weeks to get to full effect.  Although, some are hyper-responders and we reviewed that as well.  She should not increase Lantus until after she sees how she responds to Mounjaro.  Plans to start Mounjaro Friday. I reviewed importance of protein/carbs/fiber in general with diabetes and how to eat to help avoid side effects with Mounjaro.  If Bgs still over 200 after few days of Mounjaro, she will increase Lantus to 17 units if needed. We also talked about decreasing insulin 20% if fastings get down into 80s or less. Message me if assistance is needed in coming weeks.  Follow-up scheduled in 2 weeks.  We did also discuss how she may feel low when not really low as she has been so high but hopefully, things will drop slowly enough that it will not happen.  Discussed how a post meal rise should be 60mg/dl or less.  Explained how to log meals in her tammi.    See CGM Reports in Monitoring section below.      Glucose Patterns & Trends:  {Glucose Patterns & Trends:863905}    Care Plan and Education Provided:  {Care Plan and Education Provided:864175}    Patient verbalized understanding of diabetes self-management education concepts discussed, opportunities for ongoing education and support, and recommendations provided today.    Plan    ***  Topics to cover at upcoming visits: {AADE 7:991934}    See Care Plan for co-developed, patient-state behavior change goals.    Education Materials Provided:  {CDE EDUCATION MATERIALS:821414}      Subjective/Objective  Claire is an 44 year old, presenting for the following diabetes education related to: CGM Review  Accompanied by: Self  Diabetes education in the past 24mo: (Patient-Rptd) No  Focus of  "Visit: Taking Medication  Diabetes type: (Patient-Rptd) Type 2  Disease course: (Patient-Rptd) Other (see Comments)  Please elaborate:: (Patient-Rptd) Not able to get blood sugars in 100s  How confident are you filling out medical forms by yourself:: (Patient-Rptd) Extremely  Diabetes management related comments/concerns: (Patient-Rptd) Trying to control blood sugars  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 Naylor for help with Mounjaro/Lantus titrations.  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  Metformin in the past has caused \"brain fog\".    Diabetes Medication(s)       Insulin       insulin glargine (LANTUS SOLOSTAR) 100 UNIT/ML pen Start with 10 units per day and titrate up by 2 units every 3 days until the patient reaches fasting plasma glucose target less than 130. Max 30 units per day.       Incretin Mimetic Agents       tirzepatide (MOUNJARO) 2.5 MG/0.5ML SOAJ auto-injector pen Inject 0.5 mLs (2.5 mg) subcutaneously once a week for 28 days.          Lab Results   Component Value Date    A1C 10.6 07/02/2025    A1C 8.6 02/01/2024    A1C 7.6 04/07/2023    A1C 6.9 01/04/2023    A1C 5.0 02/22/2011     Just started wearing Guille last week on Wednesday.  Is using phone.  Linked up data today.    Is taking Lantus 10 units daily at bedtime.  Started Wednesday so has had 5 doses.  Did not see that she could have self titrated doses.  Thought her DR said to wait until BGS are in 100s before Mounjaro so has not started it yet but she does have it.    Claire asks about if she will feel low when not really low.    Claire asks about why her Bgs are still really high when she is eating right, etc.    Claire asks what a good post meal spike should be.    She is eating low carb to try and help her blood sugars.      Diabetes Symptoms & Complications:  Diabetes Related Symptoms: (Patient-Rptd) " "Fatigue, Polydipsia (increased thirst), Polyphagia (increased hunger)  Weight trend: (Patient-Rptd) Increasing  Symptom course: (Patient-Rptd) Stable  Disease course: (Patient-Rptd) Other (see Comments)  Please elaborate:: (Patient-Rptd) Not able to get blood sugars in 100s  Complications assessed today?: No    Patient Problem List and Family Medical History reviewed for relevant medical history, current medical status, and diabetes risk factors.    Vitals:  LMP 06/16/2025 (Exact Date)   Estimated body mass index is 33.07 kg/m  as calculated from the following:    Height as of 7/2/25: 1.727 m (5' 8\").    Weight as of 7/2/25: 98.7 kg (217 lb 8 oz).   Last 3 BP:   BP Readings from Last 3 Encounters:   07/02/25 122/88   04/27/24 (!) 134/90   02/01/24 124/88       History   Smoking Status     Never   Smokeless Tobacco     Never       Labs:  Lab Results   Component Value Date    A1C 10.6 07/02/2025     Lab Results   Component Value Date     07/02/2025     01/12/2022     Lab Results   Component Value Date    LDL 55 07/02/2025     Direct Measure HDL   Date Value Ref Range Status   07/02/2025 36 (L) >=50 mg/dL Final     GFR Estimate   Date Value Ref Range Status   07/02/2025 >90 >60 mL/min/1.73m2 Final     Comment:     eGFR calculated using 2021 CKD-EPI equation.   03/13/2019 >60 >60 mL/min/1.73m2 Final     GFR Estimate If Black   Date Value Ref Range Status   03/13/2019 >60 >60 mL/min/1.73m2 Final     Lab Results   Component Value Date    CR 0.67 07/02/2025     Lab Results   Component Value Date    MICROL 52.8 07/02/2025    UMALCR 26.94 (H) 07/02/2025    UCRR 196.0 07/02/2025       {Core Behaviors Assessed Today:312568}    ***  Time Spent: { :256323} minutes  Encounter Type: Individual    Any diabetes medication dose changes were made via the CDCES Standing Orders under the patient's referring provider.      "

## 2025-07-07 NOTE — PATIENT INSTRUCTIONS
Increase Lantus to 15 units tonight.  Stay on this dose at least until after you start the Mounjaro.  Next step will be 17 units, if needed (fasting sugars still over 200).  Do not increase insulin more than 2 units every 3 days.  Cut dose 20% if fasting sugars get under 90.  Start Mounjaro 2.5mg weekly.  Friday is fine.  Remember to eat smaller more frequent meals.  Avoid spicy/greasy foods.    Nafisa Koch, PharmD, [facility], [facility] and [place] [facility]    [place] [facility] for the [facility]:  For Your Diabetes Education and Nutrition Appointments Call:  [phone]   For Diabetes Education or Nutrition Related Questions:   Send [employer] Message   If you need a medication refill please contact your pharmacy. Please allow 3 business days for your refills to be completed.  I truly appreciate your feedback on our appointment together. You will either receive an email, text message, and/or phone call survey about today's appointment. Please complete this survey as soon as you are able so I can continue to improve upon my practice. Thank you!    You have been started on a new medication for your diabetes called Mounjaro.    In Type 2 Diabetes, you may not be making enough gut hormones that help manage appetite, stomach fullness, glucose levels after meals and weight.     You should NOT use Mounjaro if you have a history of pancreatitis, severe gastroparesis or thyroid cancer in you or your family.      Mounjaro is a once a week non-insulin injection with a combination of 2 gut hormones.    With Mounjaro, there are different doses which are gradually increased to limit side effects. The starting dose is 2.5mg once a week.       Benefits: This medication will help you feel full with a smaller amount of food. It will lower your blood sugars after meals. The benefits are improved blood sugars, decrease in appetite and possible weight loss.     Side effects:  "Side effects from Mounjaro are more likely in the first 10-14 days of use and usually decrease after that. You may experience nausea if you eat too much with this medication, therefore you should eat LESS to avoid nausea.  It can be helpful to eat slower and eat smaller, more frequent meals rather than 3 big meals per day. Stop eating when you are no longer hungry. Eat bland foods like crackers, rice, toast.  Foods/drinks with bay can also help (sugar free gingerale or bay tea).  For severe nausea, you can ask your doctor for an anti-nausea prescription.  Heartburn or indigestion are more likely with this medication especially if you eat a larger amount of food or you lay down after eating. Avoiding fried or greasy foods helps as well. You may also notice a change in your bowel movements (diarrhea or constipation).  If you experience extreme upper abdominal pain STOP THIS MEDICATION IMMEDIATELY AND CALL YOUR PROVIDER.     With Mounjaro, if you are of child-bearing age and on oral contraception, use non-oral, barrier contraception for 4 weeks at the start AND at each dose change in Mounjaro.  Do not take Mounjaro if you are pregnant, planning to become pregnant or breastfeeding. Check with your healthcare provider about an alternative.     Visit the Mounjaro website for more information on the medication and for product vouchers/savings cards: [email or web address].[email or web address]    Instructions on how to use the Mounjaro pen:              Freestyle Guille 3 or 3+    SENSOR BASICS:  Understand that your glucose sensor measures your glucose in your interstitial fluid and your blood sugar measures your glucose in your blood stream. The readings are not meant to be the same.        Your sensor glucose will lag behind your blood glucose.  \"Remember the roller coaster\".  Your blood sugar is always the front car and your sensor glucose is always the last car.  When blood sugar is moving up or down, the more difference there " "will be.          Take it easy while wearing the sensor.  Take extra care while bathing and getting dressed.  Wear loose fitting clothes on your sensor and try to avoid laying on your sensor.  You can shower/bathe with the sensor on.  But avoid submerging the sensor more than 3 feet for more than 30 minutes.  Gently pat to dry.    INITIAL SET UP:  Download the Guille by [employer] tammi if using your smartphone and create an account.  The tammi will walk you through set up.  If you are using the [employer] 3 , turn it on and follow instructions for set up.                    Guille by Bal[employer]:   There will be a 60 minute warm up for each new sensor.  Each sensor should last 14 days.  Do not take more than 500mg of vitamin C (Guille 3+ more than 1000mg) per day or this can affect sensor accuracy.    The first 12 hours of every new sensor often a little \"off\" as it gets to know your body fluids.  Set glucose alarms for highs and lows per your preference or at the recommendations of your diabetes educator.  You will not be able to silence or turn off the urgent low alert at 54 mg/dL.  If an alarm goes off, go to your tammi and acknowledge it or you will continue to get alarmed every 5 minutes.  Your Guille 3 tammi or  will notify you when it is time to change your sensor.  Just remove it like a band aid and throw it in the trash, then place a new sensor.  It is recommended to switch arms with every sensor.    DAILY ROUTINE:  Keep your phone or  within 33 feet of you to keep data communicating with your device.  If you are away from your device for more than 20 minutes, your device will alarm.  Be curious with what the sensor data shows.  How do your food choices impact your glucose?  Exercise?  Complete a fingerstick glucose check at these times:                          -when you feel differently than the sensor indicates                          -when you are not wearing a sensor                          -when you " "see this symbol:     DATA SHARING:  If you want to share your sensor data with a loved one (for phone users only), send them an invitation through the Guille 3 tammi.  They will use the Get Smart Content tammi and accept your invitation.      Get Smart Content tammi:   Our clinic will link to your data as well (phone users only). Under the menu (3 lines in the upper left corner), go to connected apps, then touch \"connect\" next to [employer], and then \"connect to a practice\".  Enter our practice ID \"[id number]\" and hit connect.  Patients using the reader can upload from home via desktop or laptop computer on [employer] or will have the  downloaded in clinic.     OTHER IMPORTANT NOTES:  If the sensor is often falling off before the 14 days are up, there are products than can help.  Talk to your diabetes educator.  You can leave your Guille sensor ON for radiology scans (Xray, CT scan or MRI), however it is recommended that you use fingersticks for accurate readings during and 1 hour after an MRI as the accuracy of the sensor is questionable during this time.  If you need help with setting up your tammi, starting a new sensor or other training, you can call [phone] or sign up at: [email or web address]  They can help you 1:1 by phone or virtual visit.  Contact the  if the sensor does not last the full 14 days for any reason, or if you have any other technical problems. Keep your Guille sensor box with the lot and serial numbers as they often ask for this information.  -[employer] customer service phone number: [phone]     -Website for guille replacement due to sensor failure or falling off: https://[email or web address].abbott/us-en/support/sensor-support-form-questions.html  If your copay is more than $75 a month, call the copay assistance line at 1-402.523.2659 and they will work with your pharmacy to get your cost down.  Or, you can give the pharmacy the following information:      [id number], Group: " "[id number], Static ID: [id number], EXP: 12/31/25     Turn off automatic OS updates and do not update your phone's OS until you check your diabetes device 's website to verify that the medical apps you use are compatible with the new OS version. Turn off automatic OS updates by navigating to your system settings, usually accessible through a gear icon, and find the \"software update\" option; within this section, look for a toggle switch labeled \"automatic updates\" or similar, and disable it.    After updating your OS or adding a new accessory such as wireless headphones, confirm alert settings and then carefully monitor your medical device tammi to make sure you can receive and hear alerts as expected.    At least once a month, check that your smartphone alerts are configured as expected. Ensure your volume, vibration, notifications, and other relevant settings still work.       "

## 2025-07-07 NOTE — PROGRESS NOTES
Diabetes Self-Management Education & Support    Presents for: CGM Review in DM2    Type of service:  Video Visit    If the video visit is dropped, the video visit invitation should be resent by: Text to cell phone: [phone]    Originating Location (pt. Location): Other work in [place], [place]  Distant Location (provider location): [facility]ABET[facility]UC[facility] [place]  Mode of Communication:  Video Conference via Contact At Once!    Video Start Time: 1054am  Video End Time (time video stopped): 1130am    How would patient like to obtain AVS? MyChart      Assessment    CGM summary, last 2 weeks:  Avg  mg/dL  GMI NA%  Time in Range (TIR)  mg/dL is 0%  0% low under 70 mg/dL (0% less than 54)  53% high 181-250 mg/dL  47% very high over 250 mg/dL  Glucose trends show: high SG average 24 hours a day.  Daily sensor glucose data shows: srinath is present, a couple higher post meal spikes.    Claire's blood sugars are still quite elevated despite new basal insulin use.  She has been on 10 units daily and did not self titrate (missed this instruction).  I increased her dose up to 15 units tonight (0.15 units/kg).  It is ok to start Mounjaro now as it takes weeks to get to full effect.  Although, some are hyper-responders and we reviewed that as well.  She should not increase Lantus until after she sees how she responds to Mounjaro.  Plans to start Mounjaro Friday. I reviewed importance of protein/carbs/fiber in general with diabetes and how to eat to help avoid side effects with Mounjaro.  If Bgs still over 200 after few days of Mounjaro, she will increase Lantus to 17 units if needed. We also talked about decreasing insulin 20% if fastings get down into 80s or less. Message me if assistance is needed in coming weeks.  Follow-up scheduled in 2 weeks.  We did also discuss how she may feel low when not really low as she has been so high but hopefully, things will drop slowly enough that it will not happen.   Discussed how a post meal rise should be 60mg/dl or less.  Explained how to log meals in her tammi.    CGM Report:          Care Plan and Education Provided:  Healthy Eating: Balanced meals, Consistency in amount and timing of carbohydrate intake, Plate planning method, and Portion control  Monitoring: Individual glucose targets and Log and interpret results  Taking Medication: Action of prescribed medication(s), Side effects of prescribed medication(s), and When to take medication(s)  Problem Solving: High glucose - causes, signs/symptoms, treatment and prevention and When to call a health care provider    Patient verbalized understanding of diabetes self-management education concepts discussed, opportunities for ongoing education and support, and recommendations provided today.    Plan    Patient Instructions   Increase Lantus to 15 units tonight.  Stay on this dose at least until after you start the Mounjaro.  Next step will be 17 units, if needed (fasting sugars still over 200).  Do not increase insulin more than 2 units every 3 days.  Cut dose 20% if fasting sugars get under 90.  Start Mounjaro 2.5mg weekly.  Friday is fine.  Remember to eat smaller more frequent meals.  Avoid spicy/greasy foods.    Nafisa Koch, PharmD, [facility], [facility] and [place] [facility]    [place] [facility] for the [facility] Area:  For Your Diabetes Education and Nutrition Appointments Call:  [phone]   For Diabetes Education or Nutrition Related Questions:   Send Aptohart Message   If you need a medication refill please contact your pharmacy. Please allow 3 business days for your refills to be completed.  I truly appreciate your feedback on our appointment together. You will either receive an email, text message, and/or phone call survey about today's appointment. Please complete this survey as soon as you are able so I can continue to improve upon my practice. Thank  you!    You have been started on a new medication for your diabetes called Mounjaro.    In Type 2 Diabetes, you may not be making enough gut hormones that help manage appetite, stomach fullness, glucose levels after meals and weight.     You should NOT use Mounjaro if you have a history of pancreatitis, severe gastroparesis or thyroid cancer in you or your family.      Mounjaro is a once a week non-insulin injection with a combination of 2 gut hormones.    With Mounjaro, there are different doses which are gradually increased to limit side effects. The starting dose is 2.5mg once a week.       Benefits: This medication will help you feel full with a smaller amount of food. It will lower your blood sugars after meals. The benefits are improved blood sugars, decrease in appetite and possible weight loss.     Side effects: Side effects from Mounjaro are more likely in the first 10-14 days of use and usually decrease after that. You may experience nausea if you eat too much with this medication, therefore you should eat LESS to avoid nausea.  It can be helpful to eat slower and eat smaller, more frequent meals rather than 3 big meals per day. Stop eating when you are no longer hungry. Eat bland foods like crackers, rice, toast.  Foods/drinks with bay can also help (sugar free gingerale or bay tea).  For severe nausea, you can ask your doctor for an anti-nausea prescription.  Heartburn or indigestion are more likely with this medication especially if you eat a larger amount of food or you lay down after eating. Avoiding fried or greasy foods helps as well. You may also notice a change in your bowel movements (diarrhea or constipation).  If you experience extreme upper abdominal pain STOP THIS MEDICATION IMMEDIATELY AND CALL YOUR PROVIDER.     With Mounjaro, if you are of child-bearing age and on oral contraception, use non-oral, barrier contraception for 4 weeks at the start AND at each dose change in Mounjaro.  Do  "not take Mounjaro if you are pregnant, planning to become pregnant or breastfeeding. Check with your healthcare provider about an alternative.     Visit the Mounjaro website for more information on the medication and for product vouchers/savings cards: [email or web address]    Instructions on how to use the Mounjaro pen:              Freestyle Guille 3 or 3+    SENSOR BASICS:  Understand that your glucose sensor measures your glucose in your interstitial fluid and your blood sugar measures your glucose in your blood stream. The readings are not meant to be the same.        Your sensor glucose will lag behind your blood glucose.  \"Remember the roller coaster\".  Your blood sugar is always the front car and your sensor glucose is always the last car.  When blood sugar is moving up or down, the more difference there will be.          Take it easy while wearing the sensor.  Take extra care while bathing and getting dressed.  Wear loose fitting clothes on your sensor and try to avoid laying on your sensor.  You can shower/bathe with the sensor on.  But avoid submerging the sensor more than 3 feet for more than 30 minutes.  Gently pat to dry.    INITIAL SET UP:  Download the Guille by [employer] tammi if using your smartphone and create an account.  The tammi will walk you through set up.  If you are using the Guille 3 , turn it on and follow instructions for set up.                    Guille by Bal[employer]zar:   There will be a 60 minute warm up for each new sensor.  Each sensor should last 14 days.  Do not take more than 500mg of vitamin C (Guille 3+ more than 1000mg) per day or this can affect sensor accuracy.    The first 12 hours of every new sensor often a little \"off\" as it gets to know your body fluids.  Set glucose alarms for highs and lows per your preference or at the recommendations of your diabetes educator.  You will not be able to silence or turn off the urgent low alert at 54 mg/dL.  If an alarm goes off, go to " "your tammi and acknowledge it or you will continue to get alarmed every 5 minutes.  Your YottaMark 3 tammi or  will notify you when it is time to change your sensor.  Just remove it like a band aid and throw it in the trash, then place a new sensor.  It is recommended to switch arms with every sensor.    DAILY ROUTINE:  Keep your phone or  within 33 feet of you to keep data communicating with your device.  If you are away from your device for more than 20 minutes, your device will alarm.  Be curious with what the sensor data shows.  How do your food choices impact your glucose?  Exercise?  Complete a fingerstick glucose check at these times:                          -when you feel differently than the sensor indicates                          -when you are not wearing a sensor                          -when you see this symbol:     DATA SHARING:  If you want to share your sensor data with a loved one (for phone users only), send them an invitation through the Guille 3 tammi.  They will use the Global Weather tammi and accept your invitation.      Global Weather tammi:   Our clinic will link to your data as well (phone users only). Under the menu (3 lines in the upper left corner), go to connected apps, then touch \"connect\" next to Featurespace, and then \"connect to a practice\".  Enter our practice ID \"[id number]\" and hit connect.  Patients using the reader can upload from home via desktop or laptop computer on evidanza or will have the  downloaded in clinic.     OTHER IMPORTANT NOTES:  If the sensor is often falling off before the 14 days are up, there are products than can help.  Talk to your diabetes educator.  You can leave your Guille sensor ON for radiology scans (Xray, CT scan or MRI), however it is recommended that you use fingersticks for accurate readings during and 1 hour after an MRI as the accuracy of the sensor is questionable during this time.  If you need help with setting up your tammi, starting a " "new sensor or other training, you can call [phone] or sign up at: [email or web address]  They can help you 1:1 by phone or virtual visit.  Contact the  if the sensor does not last the full 14 days for any reason, or if you have any other technical problems. Keep your Guille sensor box with the lot and serial numbers as they often ask for this information.  -Guille customer service phone number: [phone]     -Website for guille replacement due to sensor failure or falling off: https://www.Skemaz/us-en/support/sensor-support-form-questions.html  If your copay is more than $75 a month, call the copay assistance line at [phone] and they will work with your pharmacy to get your cost down.  Or, you can give the pharmacy the following information:      [id number], Group: [id number], Static ID: [id number], EXP: 12/31/25     Turn off automatic OS updates and do not update your phone's OS until you check your diabetes device 's website to verify that the medical apps you use are compatible with the new OS version. Turn off automatic OS updates by navigating to your system settings, usually accessible through a gear icon, and find the \"software update\" option; within this section, look for a toggle switch labeled \"automatic updates\" or similar, and disable it.    After updating your OS or adding a new accessory such as wireless headphones, confirm alert settings and then carefully monitor your medical device tammi to make sure you can receive and hear alerts as expected.    At least once a month, check that your smartphone alerts are configured as expected. Ensure your volume, vibration, notifications, and other relevant settings still work.         Topics to cover at upcoming visits: Healthy Eating, Being Active, Reducing Risks, and Healthy Coping    See Care Plan for co-developed, patient-state behavior change goals.    Education Materials Provided:  See " "AVS      Subjective/Objective  Claire is an 44 year old, presenting for the following diabetes education related to: CGM Review  Accompanied by: Self  Diabetes education in the past 24mo: (Patient-Rptd) No  Focus of Visit: Taking Medication  Diabetes type: (Patient-Rptd) Type 2  Disease course: (Patient-Rptd) Other (see Comments)  Please elaborate:: (Patient-Rptd) Not able to get blood sugars in 100s  How confident are you filling out medical forms by yourself:: (Patient-Rptd) Extremely  Diabetes management related comments/concerns: (Patient-Rptd) Trying to control blood sugars  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 Naylor for help with Mounjaro/Lantus titrations.  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  Metformin in the past has caused \"brain fog\".    Diabetes Medication(s)       Insulin       insulin glargine (LANTUS SOLOSTAR) 100 UNIT/ML pen Start with 10 units per day and titrate up by 2 units every 3 days until the patient reaches fasting plasma glucose target less than 130. Max 30 units per day.       Incretin Mimetic Agents       tirzepatide (MOUNJARO) 2.5 MG/0.5ML SOAJ auto-injector pen Inject 0.5 mLs (2.5 mg) subcutaneously once a week for 28 days.          Lab Results   Component Value Date    A1C 10.6 07/02/2025    A1C 8.6 02/01/2024    A1C 7.6 04/07/2023    A1C 6.9 01/04/2023    A1C 5.0 02/22/2011     Just started wearing Guille last week on Wednesday.  Is using phone.  Linked up data today.    Is taking Lantus 10 units daily at bedtime.  Started Wednesday so has had 5 doses.  Did not see that she could have self titrated doses.  Thought her DR said to wait until BGS are in 100s before Mounjaro so has not started it yet but she does have it.    Claire asks about if she will feel low when not really low.    Claire asks about why her Bgs are still really high when she is " "eating right, etc.    Claire asks what a good post meal spike should be.    She is eating low carb to try and help her blood sugars.      Diabetes Symptoms & Complications:  Diabetes Related Symptoms: (Patient-Rptd) Fatigue, Polydipsia (increased thirst), Polyphagia (increased hunger)  Weight trend: (Patient-Rptd) Increasing  Symptom course: (Patient-Rptd) Stable  Disease course: (Patient-Rptd) Other (see Comments)  Please elaborate:: (Patient-Rptd) Not able to get blood sugars in 100s  Complications assessed today?: No    Patient Problem List and Family Medical History reviewed for relevant medical history, current medical status, and diabetes risk factors.    Vitals:  LMP 06/16/2025 (Exact Date)   Estimated body mass index is 33.07 kg/m  as calculated from the following:    Height as of 7/2/25: 1.727 m (5' 8\").    Weight as of 7/2/25: 98.7 kg (217 lb 8 oz).   Last 3 BP:   BP Readings from Last 3 Encounters:   07/02/25 122/88   04/27/24 (!) 134/90   02/01/24 124/88       History   Smoking Status    Never   Smokeless Tobacco    Never       Labs:  Lab Results   Component Value Date    A1C 10.6 07/02/2025     Lab Results   Component Value Date     07/02/2025     01/12/2022     Lab Results   Component Value Date    LDL 55 07/02/2025     Direct Measure HDL   Date Value Ref Range Status   07/02/2025 36 (L) >=50 mg/dL Final     GFR Estimate   Date Value Ref Range Status   07/02/2025 >90 >60 mL/min/1.73m2 Final     Comment:     eGFR calculated using 2021 CKD-EPI equation.   03/13/2019 >60 >60 mL/min/1.73m2 Final     GFR Estimate If Black   Date Value Ref Range Status   03/13/2019 >60 >60 mL/min/1.73m2 Final     Lab Results   Component Value Date    CR 0.67 07/02/2025     Lab Results   Component Value Date    MICROL 52.8 07/02/2025    UMALCR 26.94 (H) 07/02/2025    UCRR 196.0 07/02/2025 7/7/2025   Monitoring   Monitoring Assessed Today Yes   Blood Glucose Meter FreeStyle;CGM   Times checking blood " sugar at home (number) 5+   Times checking blood sugar at home (per) Day   Blood glucose trend No change       Diabetes Medication(s)       Insulin       insulin glargine (LANTUS SOLOSTAR) 100 UNIT/ML pen Start with 10 units per day and titrate up by 2 units every 3 days until the patient reaches fasting plasma glucose target less than 130. Max 30 units per day.       Incretin Mimetic Agents       tirzepatide (MOUNJARO) 2.5 MG/0.5ML SOAJ auto-injector pen Inject 0.5 mLs (2.5 mg) subcutaneously once a week for 28 days.              7/7/2025   Taking Medications   Taking Medication Assessed Today Yes   Current Treatments Diet;Insulin Injections;Non-insulin Injectables   Dose schedule At bedtime   Problems taking diabetes medications regularly? No   Diabetes medication side effects? No         7/7/2025   Problem Solving   Problem Solving Assessed Today Yes   Is the patient at risk for hypoglycemia? Yes   Hypoglycemia Frequency Never   Does patient have glucagon emergency kit? No   Is the patient at risk for DKA? No   Does patient have severe weather/disaster plan for diabetes management? Not Needed   Hypoglycemia None   Hypoglycemia Complications None       Nafisa Koch, PharmD, [facility], [facility] and [place] [facility]    Time Spent: 36 minutes  Encounter Type: Individual    Any diabetes medication dose changes were made via the [facility] under the patient's referring provider.

## 2025-07-21 ENCOUNTER — VIRTUAL VISIT (OUTPATIENT)
Dept: EDUCATION SERVICES | Facility: CLINIC | Age: 45
End: 2025-07-21
Payer: COMMERCIAL

## 2025-07-21 DIAGNOSIS — E11.9 TYPE 2 DIABETES MELLITUS WITHOUT COMPLICATION, WITHOUT LONG-TERM CURRENT USE OF INSULIN (H): Primary | ICD-10-CM

## 2025-07-21 NOTE — PROGRESS NOTES
Diabetes Self-Management Education & Support    Presents for: CGM Review in DM2    Type of service:  Video Visit    If the video visit is dropped, the video visit invitation should be resent by: Text to cell phone: [phone]    Originating Location (pt. Location): Home  Distant Location (provider location): [facility] DIABETES EDUCATION [place]  Mode of Communication:  Video Conference via Graffiti    Video Start Time: 324pm  Video End Time (time video stopped): 339pm    How would patient like to obtain AVS? MyChart      Assessment    CGM summary, last 2 weeks:  Avg  mg/dL  GMI 7.5%  Time in Range (TIR)  mg/dL is 61%  0% low under 70 mg/dL (0% less than 54)  35% high 181-250 mg/dL  4% very high over 250 mg/dL  Glucose trends show: first week highs fasting and after breakfast, now last 7 days TIR nearly 100%    Claire is tolerating the Mounjaro ok.  Feels a little nausea and fatigue the day after taking it.  She is not feeling as hungry and eating less. She has already lost 12# so we discussed healthy diet- importance of not going more than 3 hours during the day without eating a small snack if missing meals.  Importance of choosing healthy foods, healthy carbs, importance of protein and water.  Also recommended strength training 2-3 x a week to prevent loss of muscle mass.  Provided self titration guidelines for insulin over next 2-3 weeks until we follow-up again.    CGM Report          Care Plan and Education Provided:  Healthy Eating: importance of eating regularly, importance of healthy carbs, protein, water  Being Active: strength training to avoid loss of muscle mass  Monitoring: Individual glucose targets and Log and interpret results  Taking Medication: dosing instructions  Problem Solving: When to call a health care provider    Patient verbalized understanding of diabetes self-management education concepts discussed, opportunities for ongoing education and support, and  recommendations provided today.    Plan    Patient Instructions   Continue Mounjaro 2.5mg weekly x 2 more weeks.  If overnight/morning blood sugars get down into the 80s or less, cut insulin by 2 units.  If you get down to 7 units, you can just stop the insulin.  3.  Focus on nutrition- especially healthy carbs, protein and plenty of water.  4.  Make sure you are strength training 2 or 3 times a week to prevent loss of muscle mass with rapid weight loss.      Nafisa Koch, PharmD, [facility], [facility] and [place] [facility]    [place] [facility] for the [facility]:  For Your Diabetes Education and Nutrition Appointments Call:  [phone]   For Diabetes Education or Nutrition Related Questions:   Send AudioTrip Message   If you need a medication refill please contact your pharmacy. Please allow 3 business days for your refills to be completed.  I truly appreciate your feedback on our appointment together. You will either receive an email, text message, and/or phone call survey about today's appointment. Please complete this survey as soon as you are able so I can continue to improve upon my practice. Thank you!      Topics to cover at upcoming visits: PRN    See Care Plan for co-developed, patient-state behavior change goals.    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  Our last visit was on 7/7/25 where insulin was increased and Mounjaro was going to be  "started in a few days.    Diabetes Medication(s)       Insulin       insulin glargine (LANTUS SOLOSTAR) 100 UNIT/ML pen Start with 10 units per day and titrate up by 2 units every 3 days until the patient reaches fasting plasma glucose target less than 130. Max 30 units per day.       Incretin Mimetic Agents       tirzepatide (MOUNJARO) 2.5 MG/0.5ML SOAJ auto-injector pen Inject 0.5 mLs (2.5 mg) subcutaneously once a week for 28 days.          Lab Results   Component Value Date    A1C 10.6 07/02/2025    A1C 8.6 02/01/2024    A1C 7.6 04/07/2023    A1C 6.9 01/04/2023    A1C 5.0 02/22/2011     Feeling slightly nauseous morning after taking Mounjaro.  Overall very tired also.    Down 12# in 2 weeks.    Trying to remember to eat meals/snacks as is not hungry.  Feels like she is still eating bigger meals.    Lantus dose is at 11 units.  She did cut down on her own from 15 units by 2 units each time a couple times.      Diabetes Symptoms & Complications:  Diabetes Related Symptoms: Fatigue, Polydipsia (increased thirst), Polyphagia (increased hunger)  Weight trend: Decreasing  Symptom course: Stable  Complications assessed today?: No    Patient Problem List and Family Medical History reviewed for relevant medical history, current medical status, and diabetes risk factors.    Vitals:  LMP 06/16/2025 (Exact Date)   Estimated body mass index is 33.07 kg/m  as calculated from the following:    Height as of 7/2/25: 1.727 m (5' 8\").    Weight as of 7/2/25: 98.7 kg (217 lb 8 oz).   Last 3 BP:   BP Readings from Last 3 Encounters:   07/02/25 122/88   04/27/24 (!) 134/90   02/01/24 124/88       History   Smoking Status    Never   Smokeless Tobacco    Never       Labs:  Lab Results   Component Value Date    A1C 10.6 07/02/2025     Lab Results   Component Value Date     07/02/2025     01/12/2022     Lab Results   Component Value Date    LDL 55 07/02/2025     Direct Measure HDL   Date Value Ref Range Status   07/02/2025 " 36 (L) >=50 mg/dL Final     GFR Estimate   Date Value Ref Range Status   07/02/2025 >90 >60 mL/min/1.73m2 Final     Comment:     eGFR calculated using 2021 CKD-EPI equation.   03/13/2019 >60 >60 mL/min/1.73m2 Final     GFR Estimate If Black   Date Value Ref Range Status   03/13/2019 >60 >60 mL/min/1.73m2 Final     Lab Results   Component Value Date    CR 0.67 07/02/2025     Lab Results   Component Value Date    MICROL 52.8 07/02/2025    UMALCR 26.94 (H) 07/02/2025    UCRR 196.0 07/02/2025 7/21/2025   Healthy Eating   Healthy Eating Assessed Today Yes   Cultural/Scientology diet restrictions? No   Meal planning/habits Avoiding sweets;Carb counting   Who cooks/prepares meals for you? Self   Who purchases food in  your home? Self   How many times a week on average do you eat food made away from home (restaurant/take-out)? 2   Meals include Breakfast;Lunch;Dinner;Morning Snack;Afternoon Snack   Beverages Water;Diet soda         7/21/2025   Being Active   Being Active Assessed Today No   Barrier to exercise None         7/21/2025   Monitoring   Monitoring Assessed Today Yes   Blood Glucose Meter FreeStyle;CGM   Times checking blood sugar at home (number) 5+   Times checking blood sugar at home (per) Day   Blood glucose trend No change       Diabetes Medication(s)       Insulin       insulin glargine (LANTUS SOLOSTAR) 100 UNIT/ML pen Start with 10 units per day and titrate up by 2 units every 3 days until the patient reaches fasting plasma glucose target less than 130. Max 30 units per day.       Incretin Mimetic Agents       tirzepatide (MOUNJARO) 2.5 MG/0.5ML SOAJ auto-injector pen Inject 0.5 mLs (2.5 mg) subcutaneously once a week for 28 days.              7/21/2025   Taking Medications   Taking Medication Assessed Today Yes   Current Treatments Diet;Insulin Injections;Non-insulin Injectables   Dose schedule At bedtime   Given by Patient   Injection/Infusion sites Abdomen   Problems taking diabetes medications  regularly? No   Diabetes medication side effects? Yes       fatigue, nausea         7/21/2025   Problem Solving   Problem Solving Assessed Today Yes   Is the patient at risk for hypoglycemia? Yes   Hypoglycemia Frequency Never   Does patient have glucagon emergency kit? No   Is the patient at risk for DKA? No   Does patient have severe weather/disaster plan for diabetes management? Not Needed   Hypoglycemia None   Hypoglycemia Complications None       Nafisa Koch, PharmD, [facility], [facility] and [place] [facility]    Time Spent: 15 minutes  Encounter Type: Individual    Any diabetes medication dose changes were made via the [facility] Standing Orders under the patient's referring provider.

## 2025-07-21 NOTE — PATIENT INSTRUCTIONS
Continue Mounjaro 2.5mg weekly x 2 more weeks.  If overnight/morning blood sugars get down into the 80s or less, cut insulin by 2 units.  If you get down to 7 units, you can just stop the insulin.  3.  Focus on nutrition- especially healthy carbs, protein and plenty of water.  4.  Make sure you are strength training 2 or 3 times a week to prevent loss of muscle mass with rapid weight loss.      Nafisa Koch, PharmD, [facility], [facility] and [place] [facility]    [place] [facility] for the [facility]:  For Your Diabetes Education and Nutrition Appointments Call:  [phone]   For Diabetes Education or Nutrition Related Questions:   Send BaubleBar Message   If you need a medication refill please contact your pharmacy. Please allow 3 business days for your refills to be completed.  I truly appreciate your feedback on our appointment together. You will either receive an email, text message, and/or phone call survey about today's appointment. Please complete this survey as soon as you are able so I can continue to improve upon my practice. Thank you!

## 2025-08-07 ENCOUNTER — VIRTUAL VISIT (OUTPATIENT)
Dept: EDUCATION SERVICES | Facility: CLINIC | Age: 45
End: 2025-08-07
Payer: COMMERCIAL

## 2025-08-07 DIAGNOSIS — E11.9 TYPE 2 DIABETES MELLITUS WITHOUT COMPLICATION, WITHOUT LONG-TERM CURRENT USE OF INSULIN (H): Primary | ICD-10-CM

## 2025-08-18 ENCOUNTER — VIRTUAL VISIT (OUTPATIENT)
Dept: FAMILY MEDICINE | Facility: CLINIC | Age: 45
End: 2025-08-18
Payer: COMMERCIAL

## 2025-08-18 DIAGNOSIS — R03.1 BLOOD PRESSURE LOWER THAN PRIOR MEASUREMENT: ICD-10-CM

## 2025-08-18 DIAGNOSIS — E11.65 TYPE 2 DIABETES MELLITUS WITH HYPERGLYCEMIA, WITHOUT LONG-TERM CURRENT USE OF INSULIN (H): Primary | ICD-10-CM

## 2025-08-18 PROCEDURE — 98006 SYNCH AUDIO-VIDEO EST MOD 30: CPT | Performed by: FAMILY MEDICINE

## 2025-08-18 PROCEDURE — 1126F AMNT PAIN NOTED NONE PRSNT: CPT | Mod: 95 | Performed by: FAMILY MEDICINE

## 2025-08-21 ENCOUNTER — OFFICE VISIT (OUTPATIENT)
Dept: DERMATOLOGY | Facility: CLINIC | Age: 45
End: 2025-08-21
Attending: PHYSICIAN ASSISTANT
Payer: COMMERCIAL

## 2025-08-21 DIAGNOSIS — Z12.83 ENCOUNTER FOR SCREENING FOR MALIGNANT NEOPLASM OF SKIN: ICD-10-CM

## 2025-08-21 DIAGNOSIS — L91.8 INFLAMED SKIN TAG: ICD-10-CM

## 2025-08-21 DIAGNOSIS — D49.2 NEOPLASM OF UNSPECIFIED BEHAVIOR OF BONE, SOFT TISSUE, AND SKIN: ICD-10-CM

## 2025-08-21 DIAGNOSIS — L72.0 MILIA: Primary | ICD-10-CM

## 2025-08-25 ENCOUNTER — LAB (OUTPATIENT)
Dept: LAB | Facility: CLINIC | Age: 45
End: 2025-08-25
Payer: COMMERCIAL

## 2025-08-25 ENCOUNTER — ALLIED HEALTH/NURSE VISIT (OUTPATIENT)
Dept: FAMILY MEDICINE | Facility: CLINIC | Age: 45
End: 2025-08-25
Payer: COMMERCIAL

## 2025-08-25 VITALS — DIASTOLIC BLOOD PRESSURE: 76 MMHG | SYSTOLIC BLOOD PRESSURE: 110 MMHG | HEART RATE: 77 BPM

## 2025-08-25 DIAGNOSIS — R03.1 BLOOD PRESSURE LOWER THAN PRIOR MEASUREMENT: Primary | ICD-10-CM

## 2025-08-25 DIAGNOSIS — R03.1 BLOOD PRESSURE LOWER THAN PRIOR MEASUREMENT: ICD-10-CM

## 2025-08-25 DIAGNOSIS — E11.9 TYPE 2 DIABETES MELLITUS WITHOUT COMPLICATION, WITHOUT LONG-TERM CURRENT USE OF INSULIN (H): ICD-10-CM

## 2025-08-25 DIAGNOSIS — F41.9 ANXIETY: ICD-10-CM

## 2025-08-25 LAB
ANION GAP SERPL CALCULATED.3IONS-SCNC: 12 MMOL/L (ref 7–15)
BUN SERPL-MCNC: 12.6 MG/DL (ref 6–20)
CALCIUM SERPL-MCNC: 9.6 MG/DL (ref 8.8–10.4)
CHLORIDE SERPL-SCNC: 105 MMOL/L (ref 98–107)
CREAT SERPL-MCNC: 0.72 MG/DL (ref 0.51–0.95)
EGFRCR SERPLBLD CKD-EPI 2021: >90 ML/MIN/1.73M2
GLUCOSE SERPL-MCNC: 121 MG/DL (ref 70–99)
HCO3 SERPL-SCNC: 23 MMOL/L (ref 22–29)
POTASSIUM SERPL-SCNC: 3.8 MMOL/L (ref 3.4–5.3)
SODIUM SERPL-SCNC: 140 MMOL/L (ref 135–145)

## 2025-08-25 PROCEDURE — 99207 PR NO CHARGE NURSE ONLY: CPT

## 2025-08-25 PROCEDURE — 3078F DIAST BP <80 MM HG: CPT

## 2025-08-25 PROCEDURE — 36415 COLL VENOUS BLD VENIPUNCTURE: CPT

## 2025-08-25 PROCEDURE — 80048 BASIC METABOLIC PNL TOTAL CA: CPT

## 2025-08-25 PROCEDURE — 3074F SYST BP LT 130 MM HG: CPT

## 2025-08-25 RX ORDER — SERTRALINE HYDROCHLORIDE 25 MG/1
25 TABLET, FILM COATED ORAL DAILY
Qty: 90 TABLET | Refills: 3 | Status: SHIPPED | OUTPATIENT
Start: 2025-08-25

## 2025-08-27 LAB
PATH REPORT.COMMENTS IMP SPEC: NORMAL
PATH REPORT.COMMENTS IMP SPEC: NORMAL
PATH REPORT.FINAL DX SPEC: NORMAL
PATH REPORT.GROSS SPEC: NORMAL
PATH REPORT.MICROSCOPIC SPEC OTHER STN: NORMAL
PATH REPORT.RELEVANT HX SPEC: NORMAL

## (undated) RX ORDER — LIDOCAINE HYDROCHLORIDE AND EPINEPHRINE 10; 10 MG/ML; UG/ML
INJECTION, SOLUTION INFILTRATION; PERINEURAL
Status: DISPENSED
Start: 2025-08-21